# Patient Record
Sex: FEMALE | Race: WHITE | NOT HISPANIC OR LATINO
[De-identification: names, ages, dates, MRNs, and addresses within clinical notes are randomized per-mention and may not be internally consistent; named-entity substitution may affect disease eponyms.]

---

## 2017-03-07 ENCOUNTER — APPOINTMENT (OUTPATIENT)
Dept: GASTROENTEROLOGY | Facility: CLINIC | Age: 82
End: 2017-03-07

## 2017-03-07 VITALS
TEMPERATURE: 97.6 F | WEIGHT: 124 LBS | OXYGEN SATURATION: 94 % | RESPIRATION RATE: 16 BRPM | HEIGHT: 57 IN | BODY MASS INDEX: 26.75 KG/M2 | DIASTOLIC BLOOD PRESSURE: 64 MMHG | HEART RATE: 81 BPM | SYSTOLIC BLOOD PRESSURE: 110 MMHG

## 2017-11-07 ENCOUNTER — APPOINTMENT (OUTPATIENT)
Dept: GASTROENTEROLOGY | Facility: CLINIC | Age: 82
End: 2017-11-07
Payer: MEDICARE

## 2017-11-07 VITALS
HEART RATE: 69 BPM | RESPIRATION RATE: 16 BRPM | DIASTOLIC BLOOD PRESSURE: 62 MMHG | OXYGEN SATURATION: 96 % | WEIGHT: 130 LBS | TEMPERATURE: 97.5 F | HEIGHT: 57 IN | SYSTOLIC BLOOD PRESSURE: 118 MMHG | BODY MASS INDEX: 28.05 KG/M2

## 2017-11-07 DIAGNOSIS — R19.7 DIARRHEA, UNSPECIFIED: ICD-10-CM

## 2017-11-07 PROCEDURE — 99214 OFFICE O/P EST MOD 30 MIN: CPT

## 2017-11-09 LAB
C DIFF TOX GENS STL QL NAA+PROBE: NORMAL
CDIFF BY PCR: NOT DETECTED

## 2017-11-10 LAB — BACTERIA STL CULT: NORMAL

## 2017-11-11 LAB — DEPRECATED O AND P PREP STL: NORMAL

## 2017-11-13 ENCOUNTER — MESSAGE (OUTPATIENT)
Age: 82
End: 2017-11-13

## 2017-11-22 LAB — CALPROTECTIN FECAL: 153 UG/G

## 2017-11-27 ENCOUNTER — MEDICATION RENEWAL (OUTPATIENT)
Age: 82
End: 2017-11-27

## 2018-02-09 ENCOUNTER — APPOINTMENT (OUTPATIENT)
Dept: GASTROENTEROLOGY | Facility: CLINIC | Age: 83
End: 2018-02-09
Payer: MEDICARE

## 2018-02-09 ENCOUNTER — LABORATORY RESULT (OUTPATIENT)
Age: 83
End: 2018-02-09

## 2018-02-09 VITALS
BODY MASS INDEX: 28.48 KG/M2 | WEIGHT: 132 LBS | DIASTOLIC BLOOD PRESSURE: 60 MMHG | HEIGHT: 57 IN | HEART RATE: 104 BPM | OXYGEN SATURATION: 94 % | SYSTOLIC BLOOD PRESSURE: 112 MMHG | RESPIRATION RATE: 16 BRPM

## 2018-02-09 PROCEDURE — 99214 OFFICE O/P EST MOD 30 MIN: CPT | Mod: 25

## 2018-02-09 PROCEDURE — 36415 COLL VENOUS BLD VENIPUNCTURE: CPT

## 2018-02-09 RX ORDER — MESALAMINE 1000 MG/1
1000 SUPPOSITORY RECTAL
Qty: 30 | Refills: 3 | Status: ACTIVE | COMMUNITY
Start: 2018-02-09 | End: 1900-01-01

## 2018-02-10 LAB
ALBUMIN SERPL ELPH-MCNC: 3.5 G/DL
ALP BLD-CCNC: 85 U/L
ALT SERPL-CCNC: 12 U/L
ANION GAP SERPL CALC-SCNC: 15 MMOL/L
AST SERPL-CCNC: 22 U/L
BASOPHILS # BLD AUTO: 0.19 K/UL
BASOPHILS NFR BLD AUTO: 1.8 %
BILIRUB SERPL-MCNC: 0.2 MG/DL
BUN SERPL-MCNC: 12 MG/DL
CALCIUM SERPL-MCNC: 8.9 MG/DL
CHLORIDE SERPL-SCNC: 99 MMOL/L
CO2 SERPL-SCNC: 26 MMOL/L
CREAT SERPL-MCNC: 0.67 MG/DL
CRP SERPL-MCNC: 6.5 MG/DL
EOSINOPHIL # BLD AUTO: 0.64 K/UL
EOSINOPHIL NFR BLD AUTO: 6.2
GLUCOSE SERPL-MCNC: 124 MG/DL
HCT VFR BLD CALC: 37.5 %
HGB BLD-MCNC: 11.5 G/DL
LYMPHOCYTES # BLD AUTO: 2.46 K/UL
LYMPHOCYTES NFR BLD AUTO: 23.9 %
MAN DIFF?: NORMAL
MCHC RBC-ENTMCNC: 30.4 PG
MCHC RBC-ENTMCNC: 30.7 GM/DL
MCV RBC AUTO: 99.2 FL
MONOCYTES # BLD AUTO: 1.82 K/UL
MONOCYTES NFR BLD AUTO: 17.7 %
NEUTROPHILS # BLD AUTO: 5.2 K/UL
NEUTROPHILS NFR BLD AUTO: 48.6 %
PLATELET # BLD AUTO: 300 K/UL
POTASSIUM SERPL-SCNC: 3.6 MMOL/L
PROT SERPL-MCNC: 7.7 G/DL
RBC # BLD: 3.78 M/UL
RBC # FLD: 14.3 %
SODIUM SERPL-SCNC: 140 MMOL/L
T4 FREE SERPL-MCNC: 1.3 NG/DL
TSH SERPL-ACNC: 0.62 UIU/ML
WBC # FLD AUTO: 10.31 K/UL

## 2018-02-11 LAB
C DIFF TOX GENS STL QL NAA+PROBE: NORMAL
CDIFF BY PCR: DETECTED
GI PCR PANEL, STOOL: NORMAL

## 2018-02-28 ENCOUNTER — MESSAGE (OUTPATIENT)
Age: 83
End: 2018-02-28

## 2018-03-03 LAB — CALPROTECTIN FECAL: 630 UG/G

## 2018-04-05 ENCOUNTER — RX RENEWAL (OUTPATIENT)
Age: 83
End: 2018-04-05

## 2018-06-27 ENCOUNTER — MEDICATION RENEWAL (OUTPATIENT)
Age: 83
End: 2018-06-27

## 2018-11-11 ENCOUNTER — EMERGENCY (EMERGENCY)
Facility: HOSPITAL | Age: 83
LOS: 1 days | Discharge: HOME | End: 2018-11-11
Attending: EMERGENCY MEDICINE
Payer: MEDICARE

## 2018-11-11 VITALS
HEART RATE: 82 BPM | DIASTOLIC BLOOD PRESSURE: 82 MMHG | OXYGEN SATURATION: 95 % | RESPIRATION RATE: 18 BRPM | TEMPERATURE: 98 F | SYSTOLIC BLOOD PRESSURE: 148 MMHG

## 2018-11-11 VITALS
SYSTOLIC BLOOD PRESSURE: 148 MMHG | TEMPERATURE: 98 F | RESPIRATION RATE: 16 BRPM | HEART RATE: 93 BPM | HEIGHT: 57 IN | WEIGHT: 134.92 LBS | DIASTOLIC BLOOD PRESSURE: 75 MMHG | OXYGEN SATURATION: 95 %

## 2018-11-11 LAB
ALBUMIN SERPL ELPH-MCNC: 3.9 G/DL — SIGNIFICANT CHANGE UP (ref 3.3–5)
ALP SERPL-CCNC: 93 U/L — SIGNIFICANT CHANGE UP (ref 40–120)
ALT FLD-CCNC: 10 U/L — SIGNIFICANT CHANGE UP (ref 10–45)
ANION GAP SERPL CALC-SCNC: 12 MMOL/L — SIGNIFICANT CHANGE UP (ref 5–17)
AST SERPL-CCNC: 18 U/L — SIGNIFICANT CHANGE UP (ref 10–40)
BASOPHILS # BLD AUTO: 0.1 K/UL — SIGNIFICANT CHANGE UP (ref 0–0.2)
BASOPHILS NFR BLD AUTO: 0.6 % — SIGNIFICANT CHANGE UP (ref 0–2)
BILIRUB SERPL-MCNC: 0.2 MG/DL — SIGNIFICANT CHANGE UP (ref 0.2–1.2)
BUN SERPL-MCNC: 15 MG/DL — SIGNIFICANT CHANGE UP (ref 7–23)
CALCIUM SERPL-MCNC: 8.9 MG/DL — SIGNIFICANT CHANGE UP (ref 8.4–10.5)
CHLORIDE SERPL-SCNC: 99 MMOL/L — SIGNIFICANT CHANGE UP (ref 96–108)
CO2 SERPL-SCNC: 26 MMOL/L — SIGNIFICANT CHANGE UP (ref 22–31)
CREAT SERPL-MCNC: 0.54 MG/DL — SIGNIFICANT CHANGE UP (ref 0.5–1.3)
EOSINOPHIL # BLD AUTO: 0.4 K/UL — SIGNIFICANT CHANGE UP (ref 0–0.5)
EOSINOPHIL NFR BLD AUTO: 3.3 % — SIGNIFICANT CHANGE UP (ref 0–6)
GLUCOSE SERPL-MCNC: 97 MG/DL — SIGNIFICANT CHANGE UP (ref 70–99)
HCT VFR BLD CALC: 36.4 % — SIGNIFICANT CHANGE UP (ref 34.5–45)
HGB BLD-MCNC: 12 G/DL — SIGNIFICANT CHANGE UP (ref 11.5–15.5)
LYMPHOCYTES # BLD AUTO: 18.7 % — SIGNIFICANT CHANGE UP (ref 13–44)
LYMPHOCYTES # BLD AUTO: 2 K/UL — SIGNIFICANT CHANGE UP (ref 1–3.3)
MCHC RBC-ENTMCNC: 31.2 PG — SIGNIFICANT CHANGE UP (ref 27–34)
MCHC RBC-ENTMCNC: 33.1 GM/DL — SIGNIFICANT CHANGE UP (ref 32–36)
MCV RBC AUTO: 94.5 FL — SIGNIFICANT CHANGE UP (ref 80–100)
MONOCYTES # BLD AUTO: 1.1 K/UL — HIGH (ref 0–0.9)
MONOCYTES NFR BLD AUTO: 9.7 % — SIGNIFICANT CHANGE UP (ref 2–14)
NEUTROPHILS # BLD AUTO: 7.4 K/UL — SIGNIFICANT CHANGE UP (ref 1.8–7.4)
NEUTROPHILS NFR BLD AUTO: 67.6 % — SIGNIFICANT CHANGE UP (ref 43–77)
PLATELET # BLD AUTO: 265 K/UL — SIGNIFICANT CHANGE UP (ref 150–400)
POTASSIUM SERPL-MCNC: 4.3 MMOL/L — SIGNIFICANT CHANGE UP (ref 3.5–5.3)
POTASSIUM SERPL-SCNC: 4.3 MMOL/L — SIGNIFICANT CHANGE UP (ref 3.5–5.3)
PROT SERPL-MCNC: 7.2 G/DL — SIGNIFICANT CHANGE UP (ref 6–8.3)
RBC # BLD: 3.85 M/UL — SIGNIFICANT CHANGE UP (ref 3.8–5.2)
RBC # FLD: 12.5 % — SIGNIFICANT CHANGE UP (ref 10.3–14.5)
SODIUM SERPL-SCNC: 137 MMOL/L — SIGNIFICANT CHANGE UP (ref 135–145)
TROPONIN T, HIGH SENSITIVITY RESULT: 8 NG/L — SIGNIFICANT CHANGE UP (ref 0–51)
WBC # BLD: 10.9 K/UL — HIGH (ref 3.8–10.5)
WBC # FLD AUTO: 10.9 K/UL — HIGH (ref 3.8–10.5)

## 2018-11-11 PROCEDURE — 72170 X-RAY EXAM OF PELVIS: CPT

## 2018-11-11 PROCEDURE — 99284 EMERGENCY DEPT VISIT MOD MDM: CPT | Mod: GC,25

## 2018-11-11 PROCEDURE — 73060 X-RAY EXAM OF HUMERUS: CPT | Mod: 26,RT

## 2018-11-11 PROCEDURE — 99284 EMERGENCY DEPT VISIT MOD MDM: CPT | Mod: 25

## 2018-11-11 PROCEDURE — 72170 X-RAY EXAM OF PELVIS: CPT | Mod: 26

## 2018-11-11 PROCEDURE — 84484 ASSAY OF TROPONIN QUANT: CPT

## 2018-11-11 PROCEDURE — 73030 X-RAY EXAM OF SHOULDER: CPT | Mod: 26,RT

## 2018-11-11 PROCEDURE — 70450 CT HEAD/BRAIN W/O DYE: CPT | Mod: 26

## 2018-11-11 PROCEDURE — 93010 ELECTROCARDIOGRAM REPORT: CPT | Mod: GC

## 2018-11-11 PROCEDURE — 73130 X-RAY EXAM OF HAND: CPT | Mod: 26,LT

## 2018-11-11 PROCEDURE — 73020 X-RAY EXAM OF SHOULDER: CPT

## 2018-11-11 PROCEDURE — 71045 X-RAY EXAM CHEST 1 VIEW: CPT

## 2018-11-11 PROCEDURE — 73060 X-RAY EXAM OF HUMERUS: CPT

## 2018-11-11 PROCEDURE — 73030 X-RAY EXAM OF SHOULDER: CPT

## 2018-11-11 PROCEDURE — 85027 COMPLETE CBC AUTOMATED: CPT

## 2018-11-11 PROCEDURE — 73130 X-RAY EXAM OF HAND: CPT

## 2018-11-11 PROCEDURE — 80053 COMPREHEN METABOLIC PANEL: CPT

## 2018-11-11 PROCEDURE — 71045 X-RAY EXAM CHEST 1 VIEW: CPT | Mod: 26

## 2018-11-11 PROCEDURE — 70450 CT HEAD/BRAIN W/O DYE: CPT

## 2018-11-11 PROCEDURE — 93005 ELECTROCARDIOGRAM TRACING: CPT

## 2018-11-11 NOTE — ED ADULT NURSE REASSESSMENT NOTE - NS ED NURSE REASSESS COMMENT FT1
report received from MOSHE BOOKER. pt found resting in semi-dickinson position, non-labored respirations. Pt denies complaints at this time. Pt to be discharged upon final read of results. Family at bedside, will reassess

## 2018-11-11 NOTE — ED PROVIDER NOTE - OBJECTIVE STATEMENT
Sommer Thomas MD PGY-1 Pt is a 89 yo F PMH UC (on balsalazide), HTN, HLD who presents with fall this afternoon 12 PM, not on AC Sommer Thomas MD PGY-1 Pt is a 89 yo F PMH UC (on balsalazide), HTN, HLD who presents with fall this afternoon 12 PM, not on AC, unwitnessed. Fell down and dropped a chair on her left fourth finger, and hit her head on the floor. Denies LOC. Endorses 2 weeks of R shoulder and upper arm pain - does not remember what she did or if she fell. Denies any dizziness or chest pain surrounding fall.

## 2018-11-11 NOTE — ED PROVIDER NOTE - MEDICAL DECISION MAKING DETAILS
Sommer Thomas MD PGY-1 Pt is a 89 yo F PMH UC (on balsalazide), HTN, HLD who presents with fall this afternoon 12 PM, not on AC, unwitnessed. Will CTh, Xray R shoulder, L hand, pelvis, basic labs, reassess.

## 2018-11-11 NOTE — ED PROVIDER NOTE - ATTENDING CONTRIBUTION TO CARE
89 y/o female with the above documented history and HPI who on exam appears well and comfortable. VSs noted, head NC c/ small area of ecchymosis at L parietal scalp s/ palpable crack, crepitus, depression, preauricular ecchymosis or otorrhea, face s/ periorbital ecchymosis, rhinorrhea or epistaxis, EOMsI, neck supple c/ FROM s/ pain, paresthesia or midline c-spine ttp, lungs CTA, chest wall s/ ttp, cardiac sounds s/ audible m/r/g, abdomen soft, NT/ND, back s/ midline ttp, extremities s/ asymmetry c/ FROM X 4 c/ some discomfort at R shoulder on ROM testing, skin c/ ecchymosis of L 4th finger s/ bony point ttp c/ FROM of DIP, PIP and MCP s/ laceration and neurologically completely intact. Appropriate screening studies obtained. No emergent abnormality identified. Not in need of any addt'l emergent investigation or intervention. Discharged c/ appropriate instruction accompanied by family.

## 2018-11-11 NOTE — ED PROVIDER NOTE - PROGRESS NOTE DETAILS
Sommer Thomas MD PGY-1 Pt feeling well and wants to go home. Will d/c home with outpt f/u, strict return precautions and pain control

## 2018-11-11 NOTE — ED PROVIDER NOTE - PMH
GERD (gastroesophageal reflux disease)    HLD (hyperlipidemia)    HTN (hypertension)    Hypothyroid    Ulcerative colitis

## 2018-11-11 NOTE — ED PROVIDER NOTE - NSFOLLOWUPINSTRUCTIONS_ED_ALL_ED_FT
1. You were seen in the Emergency Room for fall  2. You may take Tylenol 650 mg every 6 hours as needed for pain.   3. Please follow up with your doctor in 24-48 hours.  4. Return to the emergency room or seek immediate assistance for any new or concerning symptoms (such as fevers, chills, inability to walk, weakness, decreased sensation, change in bowel or bladder habits, bleeding, confusion), or if you get worse.   5. Copies of your tests were provided to you for follow-up.  You must address all your findings with your doctor.

## 2018-11-11 NOTE — ED PROVIDER NOTE - CARE PLAN
Principal Discharge DX:	Closed head injury  Assessment and plan of treatment:	The patient was discharged from the ED in stable condition. All results of today's workup were discussed with the patient and all questions/concerns were addressed. All discharge instructions were thoroughly verbally discussed with the patient, as well as important warning signs and new/ worsening symptoms which should necessitate patient's immediate return to the ED. The patient is agreeable with discharge and expresses full understanding of all instructions given.  Secondary Diagnosis:	Fall  Secondary Diagnosis:	Contusion

## 2018-11-11 NOTE — ED PROVIDER NOTE - NS ED ROS FT
REVIEW OF SYSTEMS:  General:  no fever, no chills  HEENT: no headache,   Cardiac: no chest pain  Respiratory:  no shortness of breath  Gastrointestinal: no abdominal pain, no nausea, no vomiting,  Extremities: +left fourth finger pain and swelling, +R shoulder and R upper extremity pain, + L gluteal pain  Neuro: no focal weakness, no numbness/tingling of the extremities, no decreased sensation    -Sommer Thomas PGY-1

## 2018-11-11 NOTE — ED ADULT NURSE NOTE - NSIMPLEMENTINTERV_GEN_ALL_ED
Implemented All Fall with Harm Risk Interventions:  [place] to call system. Call bell, personal items and telephone within reach. Instruct patient to call for assistance. Room bathroom lighting operational. Non-slip footwear when patient is off stretcher. Physically safe environment: no spills, clutter or unnecessary equipment. Stretcher in lowest position, wheels locked, appropriate side rails in place. Provide visual cue, wrist band, yellow gown, etc. Monitor gait and stability. Monitor for mental status changes and reorient to person, place, and time. Review medications for side effects contributing to fall risk. Reinforce activity limits and safety measures with patient and family. Provide visual clues: red socks.

## 2018-11-26 PROBLEM — E03.9 HYPOTHYROIDISM, UNSPECIFIED: Chronic | Status: ACTIVE | Noted: 2018-11-11

## 2018-11-26 PROBLEM — K51.90 ULCERATIVE COLITIS, UNSPECIFIED, WITHOUT COMPLICATIONS: Chronic | Status: ACTIVE | Noted: 2018-11-11

## 2018-11-26 PROBLEM — I10 ESSENTIAL (PRIMARY) HYPERTENSION: Chronic | Status: ACTIVE | Noted: 2018-11-11

## 2018-11-26 PROBLEM — K21.9 GASTRO-ESOPHAGEAL REFLUX DISEASE WITHOUT ESOPHAGITIS: Chronic | Status: ACTIVE | Noted: 2018-11-11

## 2018-11-26 PROBLEM — E78.5 HYPERLIPIDEMIA, UNSPECIFIED: Chronic | Status: ACTIVE | Noted: 2018-11-11

## 2018-11-27 ENCOUNTER — APPOINTMENT (OUTPATIENT)
Dept: GASTROENTEROLOGY | Facility: CLINIC | Age: 83
End: 2018-11-27
Payer: MEDICARE

## 2018-11-27 VITALS
TEMPERATURE: 97.9 F | DIASTOLIC BLOOD PRESSURE: 70 MMHG | HEART RATE: 88 BPM | SYSTOLIC BLOOD PRESSURE: 122 MMHG | OXYGEN SATURATION: 95 % | HEIGHT: 57 IN | RESPIRATION RATE: 16 BRPM

## 2018-11-27 PROCEDURE — 99214 OFFICE O/P EST MOD 30 MIN: CPT

## 2018-11-29 LAB
C DIFF TOX GENS STL QL NAA+PROBE: NORMAL
CDIFF BY PCR: NOT DETECTED

## 2018-12-04 ENCOUNTER — MESSAGE (OUTPATIENT)
Age: 83
End: 2018-12-04

## 2018-12-06 LAB — CALPROTECTIN FECAL: 433 UG/G

## 2018-12-15 LAB — GI PCR PANEL, STOOL: NORMAL

## 2018-12-20 ENCOUNTER — MEDICATION RENEWAL (OUTPATIENT)
Age: 83
End: 2018-12-20

## 2019-01-23 ENCOUNTER — OTHER (OUTPATIENT)
Age: 84
End: 2019-01-23

## 2019-01-24 ENCOUNTER — MEDICATION RENEWAL (OUTPATIENT)
Age: 84
End: 2019-01-24

## 2019-01-24 DIAGNOSIS — A04.72 ENTEROCOLITIS DUE TO CLOSTRIDIUM DIFFICILE, NOT SPECIFIED AS RECURRENT: ICD-10-CM

## 2019-01-24 LAB
C DIFF TOX GENS STL QL NAA+PROBE: NORMAL
CDIFF BY PCR: DETECTED

## 2019-01-24 RX ORDER — VANCOMYCIN HYDROCHLORIDE 125 MG/1
125 CAPSULE ORAL 4 TIMES DAILY
Qty: 56 | Refills: 2 | Status: ACTIVE | COMMUNITY
Start: 2019-01-24 | End: 1900-01-01

## 2019-01-25 ENCOUNTER — MEDICATION RENEWAL (OUTPATIENT)
Age: 84
End: 2019-01-25

## 2019-01-25 ENCOUNTER — MESSAGE (OUTPATIENT)
Age: 84
End: 2019-01-25

## 2019-02-07 ENCOUNTER — MEDICATION RENEWAL (OUTPATIENT)
Age: 84
End: 2019-02-07

## 2019-02-07 RX ORDER — PANTOPRAZOLE 40 MG/1
40 TABLET, DELAYED RELEASE ORAL
Qty: 90 | Refills: 3 | Status: ACTIVE | COMMUNITY
Start: 2018-02-09 | End: 1900-01-01

## 2019-04-12 ENCOUNTER — MESSAGE (OUTPATIENT)
Age: 84
End: 2019-04-12

## 2019-04-12 ENCOUNTER — RX RENEWAL (OUTPATIENT)
Age: 84
End: 2019-04-12

## 2019-04-12 RX ORDER — BALSALAZIDE DISODIUM 750 MG/1
750 CAPSULE ORAL
Qty: 810 | Refills: 0 | Status: ACTIVE | COMMUNITY
Start: 2017-03-07 | End: 1900-01-01

## 2019-04-12 RX ORDER — VANCOMYCIN HYDROCHLORIDE 125 MG/1
125 CAPSULE ORAL 4 TIMES DAILY
Qty: 56 | Refills: 2 | Status: COMPLETED | COMMUNITY
Start: 2018-02-11 | End: 2019-04-12

## 2019-04-12 RX ORDER — MESALAMINE 1000 MG/1
1000 SUPPOSITORY RECTAL
Qty: 30 | Refills: 3 | Status: COMPLETED | COMMUNITY
Start: 2017-11-27 | End: 2019-04-12

## 2019-04-12 RX ORDER — MESALAMINE 1000 MG/1
1000 SUPPOSITORY RECTAL
Qty: 30 | Refills: 3 | Status: COMPLETED | COMMUNITY
Start: 2018-12-20 | End: 2019-04-12

## 2019-04-23 ENCOUNTER — INPATIENT (INPATIENT)
Facility: HOSPITAL | Age: 84
LOS: 4 days | Discharge: HOME | DRG: 948 | End: 2019-04-28
Attending: INTERNAL MEDICINE | Admitting: INTERNAL MEDICINE
Payer: MEDICARE

## 2019-04-23 VITALS
RESPIRATION RATE: 18 BRPM | SYSTOLIC BLOOD PRESSURE: 170 MMHG | HEIGHT: 57 IN | OXYGEN SATURATION: 95 % | TEMPERATURE: 98 F | DIASTOLIC BLOOD PRESSURE: 74 MMHG | WEIGHT: 130.07 LBS | HEART RATE: 108 BPM

## 2019-04-23 DIAGNOSIS — R41.82 ALTERED MENTAL STATUS, UNSPECIFIED: ICD-10-CM

## 2019-04-23 LAB
ALBUMIN SERPL ELPH-MCNC: 4.2 G/DL — SIGNIFICANT CHANGE UP (ref 3.3–5)
ALP SERPL-CCNC: 105 U/L — SIGNIFICANT CHANGE UP (ref 40–120)
ALT FLD-CCNC: 19 U/L — SIGNIFICANT CHANGE UP (ref 10–45)
ANION GAP SERPL CALC-SCNC: 17 MMOL/L — SIGNIFICANT CHANGE UP (ref 5–17)
APPEARANCE UR: CLEAR — SIGNIFICANT CHANGE UP
AST SERPL-CCNC: 32 U/L — SIGNIFICANT CHANGE UP (ref 10–40)
BASOPHILS # BLD AUTO: 0 K/UL — SIGNIFICANT CHANGE UP (ref 0–0.2)
BASOPHILS NFR BLD AUTO: 0.3 % — SIGNIFICANT CHANGE UP (ref 0–2)
BILIRUB SERPL-MCNC: 0.4 MG/DL — SIGNIFICANT CHANGE UP (ref 0.2–1.2)
BILIRUB UR-MCNC: NEGATIVE — SIGNIFICANT CHANGE UP
BUN SERPL-MCNC: 28 MG/DL — HIGH (ref 7–23)
CALCIUM SERPL-MCNC: 9.5 MG/DL — SIGNIFICANT CHANGE UP (ref 8.4–10.5)
CHLORIDE SERPL-SCNC: 99 MMOL/L — SIGNIFICANT CHANGE UP (ref 96–108)
CO2 SERPL-SCNC: 22 MMOL/L — SIGNIFICANT CHANGE UP (ref 22–31)
COLOR SPEC: ABNORMAL
CREAT SERPL-MCNC: 0.62 MG/DL — SIGNIFICANT CHANGE UP (ref 0.5–1.3)
DIFF PNL FLD: NEGATIVE — SIGNIFICANT CHANGE UP
EOSINOPHIL # BLD AUTO: 0 K/UL — SIGNIFICANT CHANGE UP (ref 0–0.5)
EOSINOPHIL NFR BLD AUTO: 0.2 % — SIGNIFICANT CHANGE UP (ref 0–6)
GAS PNL BLDV: SIGNIFICANT CHANGE UP
GAS PNL BLDV: SIGNIFICANT CHANGE UP
GLUCOSE SERPL-MCNC: 142 MG/DL — HIGH (ref 70–99)
GLUCOSE UR QL: NEGATIVE — SIGNIFICANT CHANGE UP
HCT VFR BLD CALC: 38.5 % — SIGNIFICANT CHANGE UP (ref 34.5–45)
HGB BLD-MCNC: 12.9 G/DL — SIGNIFICANT CHANGE UP (ref 11.5–15.5)
KETONES UR-MCNC: NEGATIVE — SIGNIFICANT CHANGE UP
LEUKOCYTE ESTERASE UR-ACNC: ABNORMAL
LYMPHOCYTES # BLD AUTO: 17 % — SIGNIFICANT CHANGE UP (ref 13–44)
LYMPHOCYTES # BLD AUTO: 2 K/UL — SIGNIFICANT CHANGE UP (ref 1–3.3)
MCHC RBC-ENTMCNC: 32.1 PG — SIGNIFICANT CHANGE UP (ref 27–34)
MCHC RBC-ENTMCNC: 33.5 GM/DL — SIGNIFICANT CHANGE UP (ref 32–36)
MCV RBC AUTO: 96 FL — SIGNIFICANT CHANGE UP (ref 80–100)
MONOCYTES # BLD AUTO: 1.1 K/UL — HIGH (ref 0–0.9)
MONOCYTES NFR BLD AUTO: 9.4 % — SIGNIFICANT CHANGE UP (ref 2–14)
NEUTROPHILS # BLD AUTO: 8.5 K/UL — HIGH (ref 1.8–7.4)
NEUTROPHILS NFR BLD AUTO: 73.1 % — SIGNIFICANT CHANGE UP (ref 43–77)
NITRITE UR-MCNC: NEGATIVE — SIGNIFICANT CHANGE UP
PH UR: 5.5 — SIGNIFICANT CHANGE UP (ref 5–8)
PLATELET # BLD AUTO: 257 K/UL — SIGNIFICANT CHANGE UP (ref 150–400)
POTASSIUM SERPL-MCNC: 4.2 MMOL/L — SIGNIFICANT CHANGE UP (ref 3.5–5.3)
POTASSIUM SERPL-SCNC: 4.2 MMOL/L — SIGNIFICANT CHANGE UP (ref 3.5–5.3)
PROT SERPL-MCNC: 7.7 G/DL — SIGNIFICANT CHANGE UP (ref 6–8.3)
PROT UR-MCNC: ABNORMAL
RBC # BLD: 4.01 M/UL — SIGNIFICANT CHANGE UP (ref 3.8–5.2)
RBC # FLD: 13 % — SIGNIFICANT CHANGE UP (ref 10.3–14.5)
SODIUM SERPL-SCNC: 138 MMOL/L — SIGNIFICANT CHANGE UP (ref 135–145)
SP GR SPEC: 1.03 — HIGH (ref 1.01–1.02)
T4 FREE SERPL-MCNC: 1.6 NG/DL — SIGNIFICANT CHANGE UP (ref 0.9–1.8)
TROPONIN T, HIGH SENSITIVITY RESULT: 15 NG/L — SIGNIFICANT CHANGE UP (ref 0–51)
TSH SERPL-MCNC: 1.33 UIU/ML — SIGNIFICANT CHANGE UP (ref 0.27–4.2)
UROBILINOGEN FLD QL: NEGATIVE — SIGNIFICANT CHANGE UP
WBC # BLD: 11.7 K/UL — HIGH (ref 3.8–10.5)
WBC # FLD AUTO: 11.7 K/UL — HIGH (ref 3.8–10.5)

## 2019-04-23 PROCEDURE — 93010 ELECTROCARDIOGRAM REPORT: CPT

## 2019-04-23 PROCEDURE — 99285 EMERGENCY DEPT VISIT HI MDM: CPT | Mod: GC,25

## 2019-04-23 PROCEDURE — 70450 CT HEAD/BRAIN W/O DYE: CPT | Mod: 26

## 2019-04-23 PROCEDURE — 71045 X-RAY EXAM CHEST 1 VIEW: CPT | Mod: 26

## 2019-04-23 PROCEDURE — 73522 X-RAY EXAM HIPS BI 3-4 VIEWS: CPT | Mod: 26

## 2019-04-23 RX ORDER — SERTRALINE 25 MG/1
50 TABLET, FILM COATED ORAL DAILY
Qty: 0 | Refills: 0 | Status: DISCONTINUED | OUTPATIENT
Start: 2019-04-23 | End: 2019-04-28

## 2019-04-23 RX ORDER — METOPROLOL TARTRATE 50 MG
25 TABLET ORAL DAILY
Qty: 0 | Refills: 0 | Status: DISCONTINUED | OUTPATIENT
Start: 2019-04-23 | End: 2019-04-28

## 2019-04-23 RX ORDER — DULOXETINE HYDROCHLORIDE 30 MG/1
60 CAPSULE, DELAYED RELEASE ORAL DAILY
Qty: 0 | Refills: 0 | Status: DISCONTINUED | OUTPATIENT
Start: 2019-04-23 | End: 2019-04-28

## 2019-04-23 RX ORDER — FOLIC ACID 0.8 MG
1 TABLET ORAL DAILY
Qty: 0 | Refills: 0 | Status: DISCONTINUED | OUTPATIENT
Start: 2019-04-23 | End: 2019-04-28

## 2019-04-23 RX ORDER — LACTOBACILLUS ACIDOPHILUS 100MM CELL
1 CAPSULE ORAL
Qty: 0 | Refills: 0 | Status: DISCONTINUED | OUTPATIENT
Start: 2019-04-23 | End: 2019-04-28

## 2019-04-23 RX ORDER — SODIUM CHLORIDE 9 MG/ML
500 INJECTION INTRAMUSCULAR; INTRAVENOUS; SUBCUTANEOUS ONCE
Qty: 0 | Refills: 0 | Status: COMPLETED | OUTPATIENT
Start: 2019-04-23 | End: 2019-04-23

## 2019-04-23 RX ORDER — HEPARIN SODIUM 5000 [USP'U]/ML
5000 INJECTION INTRAVENOUS; SUBCUTANEOUS EVERY 12 HOURS
Qty: 0 | Refills: 0 | Status: DISCONTINUED | OUTPATIENT
Start: 2019-04-23 | End: 2019-04-28

## 2019-04-23 RX ORDER — ATORVASTATIN CALCIUM 80 MG/1
20 TABLET, FILM COATED ORAL AT BEDTIME
Qty: 0 | Refills: 0 | Status: DISCONTINUED | OUTPATIENT
Start: 2019-04-23 | End: 2019-04-28

## 2019-04-23 RX ORDER — LEVOTHYROXINE SODIUM 125 MCG
50 TABLET ORAL DAILY
Qty: 0 | Refills: 0 | Status: DISCONTINUED | OUTPATIENT
Start: 2019-04-23 | End: 2019-04-28

## 2019-04-23 RX ADMIN — SODIUM CHLORIDE 500 MILLILITER(S): 9 INJECTION INTRAMUSCULAR; INTRAVENOUS; SUBCUTANEOUS at 17:07

## 2019-04-23 RX ADMIN — SODIUM CHLORIDE 500 MILLILITER(S): 9 INJECTION INTRAMUSCULAR; INTRAVENOUS; SUBCUTANEOUS at 13:51

## 2019-04-23 NOTE — ED ADULT NURSE NOTE - CHPI ED NUR SYMPTOMS NEG
no chills/no nausea/no decreased eating/drinking/no fever/no weakness/no tingling/no dizziness/no vomiting

## 2019-04-23 NOTE — ED ADULT NURSE REASSESSMENT NOTE - NS ED NURSE REASSESS COMMENT FT1
Patient straight catheterized as per order using sterile technique. First attempt successful. Patient tolerated procedure well. No blood noted. 20 cc of concentrated yellow urine output noted. ED MD Joseph made aware. Safety and comfort measures provided.

## 2019-04-23 NOTE — H&P ADULT - HISTORY OF PRESENT ILLNESS
Pt is a 89 yo female PMH UC, HTN, HLD, hypothyroidism, GERD, hx of falls coming in for acute mental status change since Monday. History provided by pt's daughter. Pt is acutely confused since Monday, answering her door in her undergarment, bringing down her walker from the second floor (she normally has walkers in every floor), confusing her radio for her telephone. Patient also complaining of hip pain since Friday. There is concern patient may have had a fall. When questioned, pt pleasantly confused (states her gown is black), pt not aware of anything wrong with her but reports +abd pain, hip pain and burning and blood in her urine.

## 2019-04-23 NOTE — ED PROVIDER NOTE - ATTENDING CONTRIBUTION TO CARE
Agree with above, Gabriel Breaux MD, FACEP   Will follow up on labs, analgesia, imaging, reassess and disposition to the inpatient team as clinically indicated.   Based on patient's history and physical exam, as well as the results of today's workup, I feel that patient warrants admission to the hospital for further workup/evaluation and continued management. I discussed the findings of today's workup with the patient and addressed the patient's questions and concerns. The patient was agreeable with admission. Our team spoke with the hospitalist who accepted the patient for admission and subsequently took over the patient's care.

## 2019-04-23 NOTE — ED ADULT NURSE NOTE - OBJECTIVE STATEMENT
89 y/o female presenting to the ED via walking in brought in by daughter complaining of AMS. PMH HTN, HLD, GERD, HYPOTHYROID, ULCERATIVE COLITIS. Per daughter patient was complaining of lower back pain/buttock pain x Friday 4/19. Per daughter noted patient to be acting "confused" at home x yesterday. Per daughter patient brought walker down from upstairs which she does not need to. On arrival patient awake and oriented to self and place only. No respiratory distress noted. Patient denies any chest pain, dizziness, n/v/d, numbness, tingling, SOB, headache, cough, chills, fevers. Abdomen soft, nondistended nontender to palpation. Safety and comfort measures provided. Daughter at bedside. Safety and comfort measures provided.

## 2019-04-23 NOTE — ED PROVIDER NOTE - NS ED ROS FT
REVIEW OF SYSTEMS:  minimally elicited as pt pleasantly confused  NECK: No pain   RESPIRATORY:  No shortness of breath  CHEST: Reports L sided pain on palpation,   GASTROINTESTINAL: + Abd diffused pain  GENITOURINARY: +dysuria and hematuria  NEUROLOGICAL: No numbness or weakness  SKIN: No itching, burning, rashes, or lesions   MSK: Hip pain. REVIEW OF SYSTEMS:  minimally elicited as pt pleasantly confused  NECK: No pain   RESPIRATORY:  No shortness of breath  CHEST: Reports L sided pain on palpation,   GASTROINTESTINAL: + Abd diffused pain  GENITOURINARY: +dysuria and hematuria  NEUROLOGICAL: No numbness or weakness  SKIN: No itching, burning, rashes, or lesions   MSK: Hip pain.  ROS as per HPI, attending statement, or otherwise negative.

## 2019-04-23 NOTE — H&P ADULT - ASSESSMENT
Pt is a 89 yo female PMH UC, HTN, HLD, hypothyroidism, GERD, hx of falls coming in for acute mental status change since Monday. History provided by pt's daughter. Pt is acutely confused since Monday, answering her door in her undergarment, bringing down her walker from the second floor (she normally has walkers in every floor), confusing her radio for her telephone. Patient also complaining of hip pain since Friday. There is concern patient may have had a fall. When questioned, pt pleasantly confused (states her gown is black), pt not aware of anything wrong with her but reports +abd pain, hip pain and burning and blood in her urine. Pt is a 89 yo female PMH UC, HTN, HLD, hypothyroidism, GERD, hx of falls coming in for acute mental status change since Monday. History provided by pt's daughter. Pt is acutely confused since Monday, answering her door in her undergarment, bringing down her walker from the second floor (she normally has walkers in every floor), confusing her radio for her telephone. Patient also complaining of hip pain since Friday. There is concern patient may have had a fall. When questioned, pt pleasantly confused (states her gown is black), pt not aware of anything wrong with her but reports +abd pain, hip pain and burning and blood in her urine.    Neuro: Altered mental status, no clear cause. CT brain no acute findings. UA, CXR all negative for infection. Labs all WNL. Will order B-12, folate,   TSH in AM. Doubt need for MRI of brain. PT eval requested.     CV: Continue Toprol XL 25 mg/day and Lipitor 20 mg/day.      Psych: Continue Zoloft 50 mg/day and Cymbalta 60 mg/day. Psych consult for today.      Endo: Continue Synthyroid 50 mcg/day, TSH in AM.

## 2019-04-23 NOTE — ED ADULT NURSE REASSESSMENT NOTE - NS ED NURSE REASSESS COMMENT FT1
Patient appears to be resting comfortably in stretcher. Patient denies any dizziness, n/v/d, numbness, tingling, SOB. A&OX2 to self and place only. Safety and comfort measures provided. Daughter at bedside.

## 2019-04-23 NOTE — H&P ADULT - NSICDXPASTMEDICALHX_GEN_ALL_CORE_FT
PAST MEDICAL HISTORY:  GERD (gastroesophageal reflux disease)     HLD (hyperlipidemia)     HTN (hypertension)     Hypothyroid     Ulcerative colitis

## 2019-04-23 NOTE — ED PROVIDER NOTE - PROGRESS NOTE DETAILS
Sepsis re-evaluation- Vital signs appreciated HR now 90s, Capillary Refill: <2 sec; Pulses: full/equal bilaterally; Skin: Normal; Lungs: CTA b/l. I have re-evaluated the patient's fluid status and reviewed the vital signs.  Clinical evaluation demonstrates an appropriate response to fluid resuscitation, patient responding well to resuscitation.

## 2019-04-23 NOTE — ED PROVIDER NOTE - OBJECTIVE STATEMENT
Pt is a 89yo F PMH pertinent for UC, HTN, HLD, hypothyroidism, GERD, hx of falls coming in for acute mental status change since Monday. History provided by pt's daughter. Pt is acutely confused since Monday, answering her door in her undergarment, bringing down her walker from the second floor (she normally has walkers in every floor), confusing her radio for her telephone. Patient also complaining of hip pain since Friday. There is concern patient may have had a fall. When questioned, pt pleasantly confused (states her gown is black), pt not aware of anything wrong with her but reports +abd pain, hip pain and burning and blood in her urine.

## 2019-04-23 NOTE — ED PROVIDER NOTE - CARE PLAN
Principal Discharge DX:	Altered mental status  Secondary Diagnosis:	HTN (hypertension) Principal Discharge DX:	Altered mental status  Goal:	encephalopathy, initial encounter  Secondary Diagnosis:	HTN (hypertension)

## 2019-04-23 NOTE — ED PROVIDER NOTE - CLINICAL SUMMARY MEDICAL DECISION MAKING FREE TEXT BOX
Pt is a 89yo F PMH pertinent for UC, HTN, HLD, hypothyroidism, GERD, hx of falls coming in for acute mental status change since Monday. Pt is acutely confused since Monday. Also reported hip pain since Friday. Patient pleasantly confused, endorses burning and blood in her urine. On PE, appears dry and  was tachy to 108, although hypertensive. Concerns for infectious etiologies (UTI) vs less likely metabolic, cardiovascular and endocrine etiologies. Will order CBC, electrolytes, Urinalysis, Urine and blood cultures, CXR, trops, TSH/FT4, and pelvic xrays.

## 2019-04-23 NOTE — ED PROVIDER NOTE - PHYSICAL EXAMINATION
PHYSICAL EXAM:  GENERAL: Pleasantly confused  HEAD:  Atraumatic, Normocephalic  EYES:  conjunctiva and sclera clear  ENMT: Dry mucous membranes  NECK: Supple  HEART: Regular rate and rhythm; No murmurs, rubs, or gallops  RESPIRATORY: CTA B/L, No W/R/R  ABDOMEN: Soft, Nondistended; Umbilical hernia present, diffused mild tenderness   NEUROLOGY: A&Ox1-2, nonfocal, moving all extremities  EXTREMITIES:  No pedal edema   SKIN: warm, dry, normal color,

## 2019-04-23 NOTE — H&P ADULT - NSHPLABSRESULTS_GEN_ALL_CORE
LABS:                        12.9   11.7  )-----------( 257      ( 2019 13:24 )             38.5     04-    138  |  99  |  28<H>  ----------------------------<  142<H>  4.2   |  22  |  0.62    Ca    9.5      2019 13:24    TPro  7.7  /  Alb  4.2  /  TBili  0.4  /  DBili  x   /  AST  32  /  ALT  19  /  AlkPhos  105  04-23      Urinalysis Basic - ( 2019 13:24 )    Color: Dark Yellow / Appearance: Clear / S.034 / pH: x  Gluc: x / Ketone: Negative  / Bili: Negative / Urobili: Negative   Blood: x / Protein: 30 mg/dL / Nitrite: Negative   Leuk Esterase: Small / RBC: 4 /hpf / WBC 2 /HPF   Sq Epi: x / Non Sq Epi: 4 /hpf / Bacteria: Negative          RADIOLOGY & ADDITIONAL TESTS:

## 2019-04-23 NOTE — H&P ADULT - NSHPPHYSICALEXAM_GEN_ALL_CORE
PHYSICAL EXAMINATION:  Vital Signs Last 24 Hrs  T(C): 37.1 (23 Apr 2019 19:20), Max: 37.5 (23 Apr 2019 13:09)  T(F): 98.7 (23 Apr 2019 19:20), Max: 99.5 (23 Apr 2019 13:09)  HR: 99 (23 Apr 2019 19:20) (70 - 108)  BP: 131/73 (23 Apr 2019 19:20) (131/73 - 170/74)  BP(mean): --  RR: 16 (23 Apr 2019 19:20) (15 - 18)  SpO2: 95% (23 Apr 2019 19:20) (95% - 96%)  CAPILLARY BLOOD GLUCOSE          GENERAL: NAD, well-groomed, well-developed  HEAD:  atraumatic, normocephalic  EYES: sclera anicteric  ENMT: mucous membranes moist  NECK: supple, No JVD  CHEST/LUNG: clear to auscultation bilaterally; no rales, rhonchi, or wheezing b/l  HEART: normal S1, S2  ABDOMEN: BS+, soft, ND, NT   EXTREMITIES:  pulses palpable; no clubbing, cyanosis, or edema b/l LEs  NEURO: awake, alert, interactive; moves all extremities  SKIN: no rashes or lesions PHYSICAL EXAMINATION:  Vital Signs Last 24 Hrs  T(C): 37.1 (23 Apr 2019 19:20), Max: 37.5 (23 Apr 2019 13:09)  T(F): 98.7 (23 Apr 2019 19:20), Max: 99.5 (23 Apr 2019 13:09)  HR: 99 (23 Apr 2019 19:20) (70 - 108)  BP: 131/73 (23 Apr 2019 19:20) (131/73 - 170/74)  BP(mean): --  RR: 16 (23 Apr 2019 19:20) (15 - 18)  SpO2: 95% (23 Apr 2019 19:20) (95% - 96%)  CAPILLARY BLOOD GLUCOSE          GENERAL: NAD, seen in ER, no fevers or SOB  HEAD:  atraumatic, normocephalic  EYES: sclera anicteric  ENMT: mucous membranes moist  NECK: supple, No JVD  CHEST/LUNG: clear to auscultation bilaterally; no rales, rhonchi, or wheezing b/l  HEART: normal S1, S2  ABDOMEN: BS+, soft, ND, NT   EXTREMITIES:  pulses palpable; no clubbing, cyanosis, or edema b/l LEs  NEURO: awake, alert, interactive; moves all extremities  SKIN: no rashes or lesions

## 2019-04-24 DIAGNOSIS — F05 DELIRIUM DUE TO KNOWN PHYSIOLOGICAL CONDITION: ICD-10-CM

## 2019-04-24 LAB
CULTURE RESULTS: NO GROWTH — SIGNIFICANT CHANGE UP
FOLATE SERPL-MCNC: >20 NG/ML — SIGNIFICANT CHANGE UP
RAPID RVP RESULT: SIGNIFICANT CHANGE UP
SPECIMEN SOURCE: SIGNIFICANT CHANGE UP
TROPONIN T, HIGH SENSITIVITY RESULT: 15 NG/L — SIGNIFICANT CHANGE UP (ref 0–51)
TSH SERPL-MCNC: 0.96 UIU/ML — SIGNIFICANT CHANGE UP (ref 0.27–4.2)
VIT B12 SERPL-MCNC: 943 PG/ML — SIGNIFICANT CHANGE UP (ref 232–1245)

## 2019-04-24 PROCEDURE — 99222 1ST HOSP IP/OBS MODERATE 55: CPT

## 2019-04-24 RX ORDER — SODIUM CHLORIDE 5 %
4 DROPS OPHTHALMIC (EYE)
Qty: 0 | Refills: 0 | Status: DISCONTINUED | OUTPATIENT
Start: 2019-04-24 | End: 2019-04-24

## 2019-04-24 RX ORDER — HALOPERIDOL DECANOATE 100 MG/ML
0.5 INJECTION INTRAMUSCULAR EVERY 6 HOURS
Qty: 0 | Refills: 0 | Status: DISCONTINUED | OUTPATIENT
Start: 2019-04-24 | End: 2019-04-28

## 2019-04-24 RX ORDER — SODIUM CHLORIDE 5 %
1 DROPS OPHTHALMIC (EYE)
Qty: 0 | Refills: 0 | Status: DISCONTINUED | OUTPATIENT
Start: 2019-04-24 | End: 2019-04-28

## 2019-04-24 RX ORDER — BALSALAZIDE DISODIUM 750 MG/1
3 CAPSULE ORAL
Qty: 0 | Refills: 0 | COMMUNITY

## 2019-04-24 RX ORDER — PREDNISOLONE SODIUM PHOSPHATE 1 %
1 DROPS OPHTHALMIC (EYE) DAILY
Qty: 0 | Refills: 0 | Status: DISCONTINUED | OUTPATIENT
Start: 2019-04-24 | End: 2019-04-25

## 2019-04-24 RX ORDER — LANOLIN ALCOHOL/MO/W.PET/CERES
3 CREAM (GRAM) TOPICAL AT BEDTIME
Qty: 0 | Refills: 0 | Status: DISCONTINUED | OUTPATIENT
Start: 2019-04-24 | End: 2019-04-28

## 2019-04-24 RX ORDER — BALSALAZIDE DISODIUM 750 MG/1
2250 CAPSULE ORAL THREE TIMES A DAY
Qty: 0 | Refills: 0 | Status: DISCONTINUED | OUTPATIENT
Start: 2019-04-24 | End: 2019-04-28

## 2019-04-24 RX ORDER — HALOPERIDOL DECANOATE 100 MG/ML
0.5 INJECTION INTRAMUSCULAR EVERY 6 HOURS
Qty: 0 | Refills: 0 | Status: DISCONTINUED | OUTPATIENT
Start: 2019-04-24 | End: 2019-04-24

## 2019-04-24 RX ADMIN — Medication 1 MILLIGRAM(S): at 19:02

## 2019-04-24 RX ADMIN — HEPARIN SODIUM 5000 UNIT(S): 5000 INJECTION INTRAVENOUS; SUBCUTANEOUS at 18:59

## 2019-04-24 RX ADMIN — Medication 25 MILLIGRAM(S): at 06:51

## 2019-04-24 RX ADMIN — Medication 1 DROP(S): at 22:42

## 2019-04-24 RX ADMIN — Medication 1 TABLET(S): at 06:51

## 2019-04-24 RX ADMIN — BALSALAZIDE DISODIUM 2250 MILLIGRAM(S): 750 CAPSULE ORAL at 21:11

## 2019-04-24 RX ADMIN — DULOXETINE HYDROCHLORIDE 60 MILLIGRAM(S): 30 CAPSULE, DELAYED RELEASE ORAL at 21:07

## 2019-04-24 RX ADMIN — HEPARIN SODIUM 5000 UNIT(S): 5000 INJECTION INTRAVENOUS; SUBCUTANEOUS at 06:51

## 2019-04-24 RX ADMIN — SERTRALINE 50 MILLIGRAM(S): 25 TABLET, FILM COATED ORAL at 19:01

## 2019-04-24 RX ADMIN — ATORVASTATIN CALCIUM 20 MILLIGRAM(S): 80 TABLET, FILM COATED ORAL at 21:10

## 2019-04-24 RX ADMIN — Medication 50 MICROGRAM(S): at 05:26

## 2019-04-24 RX ADMIN — Medication 1 DROP(S): at 21:11

## 2019-04-24 NOTE — BEHAVIORAL HEALTH ASSESSMENT NOTE - CASE SUMMARY
Pt is a 89 yo   female, with adult children, domiciled alone in a private residence in [place], with a HHA 3 days a week, with PPHx of unspecified depressive d/o, no inpatient psychiatric hospitalizations, no h/o SA/SIB, no h/o substance abuse, with PMHx of UC, HTN, HLD, hypothyroidism, GERD, hx of falls coming in for acute mental status change since Monday.  Psychiatry consulted to assess for delirium and AMS.  Pt confused, poor historian, unable to explain recent events, slightly lethargic. pt c/o back pain, no si/hi. rec haldol 0.5mg po/iv q6hr prn severe agitation , no 1:1 needed.

## 2019-04-24 NOTE — BEHAVIORAL HEALTH ASSESSMENT NOTE - NSBHCHARTREVIEWIMAGING_PSY_A_CORE FT
< from: CT Head No Cont (04.23.19 @ 14:36) >    EXAM:  CT BRAIN                          PROCEDURE DATE:  04/23/2019      INTERPRETATION:  HISTORY: Confusion/delirium.    Technique: CT of the head was performed without intravenous contrast.    Multiple contiguous axial images were acquired from the skullbase to the   vertex without the administration of intravenous contrast.  Coronal and   sagittal reformations were made.    COMPARISON: CT head 11/11/2018    FINDINGS:  No acute intracranial hemorrhage, mass effect or midline shift. No CT   evidence of acute territorial infarct.    The ventricles and sulci are within normal limits for the patient's age   without evidence of hydrocephalus. Mild patchy periventricular and   subcortical white matter hypodensities, compatible with chronic   microvascular changes.    The calvarium is intact. Opacification of the right maxillary sinus with   associated wall thickening, compatible with chronic sinusitis. Mild   mucosal thickening of the right ethmoid sinus. The visualized   intraorbital compartments, remaining paranasal sinuses and mastoid   complexes are free of acute disease.    IMPRESSION:  No acute intracranial hemorrhage, mass effect or midline shift. No CT   evidence of acute territorial infarct.    < end of copied text >

## 2019-04-24 NOTE — PHYSICAL THERAPY INITIAL EVALUATION ADULT - PERTINENT HX OF CURRENT PROBLEM, REHAB EVAL
89y/o F coming in for AMS change since Monday. History provided by pt's daughter. Pt is acutely confused since Monday, answering her door in her undergarment, bringing down her walker from the second floor, confusing her radio for her telephone. Pt also complaining of hip pain since Friday. There is concern pt may have had a fall.

## 2019-04-24 NOTE — BEHAVIORAL HEALTH ASSESSMENT NOTE - NSBHCHARTREVIEWVS_PSY_A_CORE FT
Vital Signs Last 24 Hrs  T(C): 36.9 (24 Apr 2019 07:30), Max: 37.5 (23 Apr 2019 13:09)  T(F): 98.4 (24 Apr 2019 07:30), Max: 99.5 (23 Apr 2019 13:09)  HR: 95 (24 Apr 2019 07:30) (70 - 108)  BP: 158/87 (24 Apr 2019 07:30) (131/73 - 170/74)  BP(mean): --  RR: 18 (24 Apr 2019 07:30) (15 - 18)  SpO2: 93% (24 Apr 2019 07:30) (93% - 96%)

## 2019-04-24 NOTE — PHYSICAL THERAPY INITIAL EVALUATION ADULT - PRECAUTIONS/LIMITATIONS, REHAB EVAL
fall precautions/When questioned, pt pleasantly confused (states her gown is black), pt not aware of anything wrong with her but reports +abd pain, hip pain and burning and blood in her urine.CXR (-) CTHead (-) XRayPelvis (-)

## 2019-04-24 NOTE — PROGRESS NOTE ADULT - SUBJECTIVE AND OBJECTIVE BOX
no  cp/sob    REVIEW OF SYSTEMS:  GEN: no fever,    no chills  RESP: no SOB,   no cough  CVS: no chest pain,   no palpitations  GI: no abdominal pain,   no nausea,   no vomiting,   no constipation,   no diarrhea  : no dysuria,   no frequency  NEURO: no headache,   no dizziness  PSYCH: no depression,   not anxious  Derm : no rash    MEDICATIONS  (STANDING):  atorvastatin 20 milliGRAM(s) Oral at bedtime  DULoxetine 60 milliGRAM(s) Oral daily  folic acid 1 milliGRAM(s) Oral daily  heparin  Injectable 5000 Unit(s) SubCutaneous every 12 hours  lactobacillus acidophilus 1 Tablet(s) Oral two times a day  levothyroxine 50 MICROGram(s) Oral daily  metoprolol succinate ER 25 milliGRAM(s) Oral daily  sertraline 50 milliGRAM(s) Oral daily    MEDICATIONS  (PRN):      Vital Signs Last 24 Hrs  T(C): 36.9 (2019 07:30), Max: 37.5 (2019 13:09)  T(F): 98.4 (2019 07:30), Max: 99.5 (2019 13:09)  HR: 95 (2019 07:30) (70 - 108)  BP: 158/87 (2019 07:30) (131/73 - 170/74)  BP(mean): --  RR: 18 (2019 07:30) (15 - 18)  SpO2: 93% (2019 07:30) (93% - 96%)  CAPILLARY BLOOD GLUCOSE        I&O's Summary      PHYSICAL EXAM:  HEAD:  Atraumatic, Normocephalic  NECK: Supple, No   JVD  CHEST/LUNG:   no     rales,     no,    rhonchi  HEART: Regular rate and rhythm;         murmur  ABDOMEN: Soft, Nontender, ;   EXTREMITIES:  no      edema  NEUROLOGY:  alert    LABS:                        12.9   11.7  )-----------( 257      ( 2019 13:24 )             38.5     04-23    138  |  99  |  28<H>  ----------------------------<  142<H>  4.2   |  22  |  0.62    Ca    9.5      2019 13:24    TPro  7.7  /  Alb  4.2  /  TBili  0.4  /  DBili  x   /  AST  32  /  ALT  19  /  AlkPhos  105            Urinalysis Basic - ( 2019 13:24 )    Color: Dark Yellow / Appearance: Clear / S.034 / pH: x  Gluc: x / Ketone: Negative  / Bili: Negative / Urobili: Negative   Blood: x / Protein: 30 mg/dL / Nitrite: Negative   Leuk Esterase: Small / RBC: 4 /hpf / WBC 2 /HPF   Sq Epi: x / Non Sq Epi: 4 /hpf / Bacteria: Negative           @ 19:56  4.4  39      Thyroid Stimulating Hormone, Serum: 1.33 uIU/mL ( @ 17:11)          Consultant(s) Notes Reviewed:      Care Discussed with Consultants/Other Providers: no  cp/sob  in er/  calm   knows  shes in hosp and name  of president  iv site, redness, right  arm    REVIEW OF SYSTEMS:  GEN: no fever,    no chills  RESP: no SOB,   no cough  CVS: no chest pain,   no palpitations  GI: no abdominal pain,   no nausea,   no vomiting,   no constipation,   no diarrhea  : no dysuria,   no frequency  NEURO: no headache,   no dizziness  PSYCH: no depression,   not anxious  Derm : no rash    MEDICATIONS  (STANDING):  atorvastatin 20 milliGRAM(s) Oral at bedtime  DULoxetine 60 milliGRAM(s) Oral daily  folic acid 1 milliGRAM(s) Oral daily  heparin  Injectable 5000 Unit(s) SubCutaneous every 12 hours  lactobacillus acidophilus 1 Tablet(s) Oral two times a day  levothyroxine 50 MICROGram(s) Oral daily  metoprolol succinate ER 25 milliGRAM(s) Oral daily  sertraline 50 milliGRAM(s) Oral daily    MEDICATIONS  (PRN):      Vital Signs Last 24 Hrs  T(C): 36.9 (2019 07:30), Max: 37.5 (2019 13:09)  T(F): 98.4 (2019 07:30), Max: 99.5 (2019 13:09)  HR: 95 (2019 07:30) (70 - 108)  BP: 158/87 (2019 07:30) (131/73 - 170/74)  BP(mean): --  RR: 18 (2019 07:30) (15 - 18)  SpO2: 93% (2019 07:30) (93% - 96%)  CAPILLARY BLOOD GLUCOSE        I&O's Summary      PHYSICAL EXAM:  HEAD:  Atraumatic, Normocephalic  NECK: Supple, No   JVD  CHEST/LUNG:   no     rales,     no,    rhonchi  HEART: Regular rate and rhythm;         murmur  ABDOMEN: Soft, Nontender, ;   EXTREMITIES:  no      edema  NEUROLOGY:  alert    LABS:                        12.9   11.7  )-----------( 257      ( 2019 13:24 )             38.5     04-    138  |  99  |  28<H>  ----------------------------<  142<H>  4.2   |  22  |  0.62    Ca    9.5      2019 13:24    TPro  7.7  /  Alb  4.2  /  TBili  0.4  /  DBili  x   /  AST  32  /  ALT  19  /  AlkPhos  105            Urinalysis Basic - ( 2019 13:24 )    Color: Dark Yellow / Appearance: Clear / S.034 / pH: x  Gluc: x / Ketone: Negative  / Bili: Negative / Urobili: Negative   Blood: x / Protein: 30 mg/dL / Nitrite: Negative   Leuk Esterase: Small / RBC: 4 /hpf / WBC 2 /HPF   Sq Epi: x / Non Sq Epi: 4 /hpf / Bacteria: Negative           @ 19:56  4.4  39      Thyroid Stimulating Hormone, Serum: 1.33 uIU/mL ( @ 17:11)          Consultant(s) Notes Reviewed:      Care Discussed with Consultants/Other Providers:

## 2019-04-24 NOTE — PHYSICAL THERAPY INITIAL EVALUATION ADULT - DISCHARGE DISPOSITION, PT EVAL
assist/supervision for all functional activity (daughter in agreement to need of supervision)/home w/ home PT

## 2019-04-24 NOTE — PHYSICAL THERAPY INITIAL EVALUATION ADULT - BALANCE TRAINING, PT EVAL
GOAL: Pt will increase static/dynamic standing balance by 1/2 grade in 2wks to decrease fall risk and increase independence with ADLs .

## 2019-04-24 NOTE — BEHAVIORAL HEALTH ASSESSMENT NOTE - SUMMARY
Pt is a 89 yo   female, with adult children, domiciled alone in a private residence in [place], with a HHA 3 days a week, with PPHx of unspecified depressive d/o, no inpatient psychiatric hospitalizations, no h/o SA/SIB, no h/o substance abuse, with PMHx of UC, HTN, HLD, hypothyroidism, GERD, hx of falls coming in for acute mental status change since Monday.  Psychiatry consulted to assess for delirium and AMS.  Patient seen and evaluated, irritable unable to state why she is in the hospital, states "I was taken out of my home", refusing to speak with writer, not combative.  Per family, patient has had an acute change in mental status, acting bizarrely, unable to recall her phone number, more confused compared to her baseline since Monday.  They report patient has been having VH while in the hospital, seeing family members in th TV, seeing babies.  They report patient has "normal age memory issues", deny patient has ever been diagnosed with dementia.  Patient's presentation consistent with delirium.  Would recommend medical workup to determine if AMS is due to organic causes.

## 2019-04-24 NOTE — BEHAVIORAL HEALTH ASSESSMENT NOTE - DETAILS
patient's son has Parkinson's patient is a Holocaust survivor lower back pain patient's son has Parkinson's disease

## 2019-04-24 NOTE — BEHAVIORAL HEALTH ASSESSMENT NOTE - NSBHCHARTREVIEWLAB_PSY_A_CORE FT
12.9   11.7  )-----------( 257      ( 2019 13:24 )             38.5     04-23    138  |  99  |  28<H>  ----------------------------<  142<H>  4.2   |  22  |  0.62    Ca    9.5      2019 13:24    TPro  7.7  /  Alb  4.2  /  TBili  0.4  /  DBili  x   /  AST  32  /  ALT  19  /  AlkPhos  105  04-23    Urinalysis Basic - ( 2019 13:24 )    Color: Dark Yellow / Appearance: Clear / S.034 / pH: x  Gluc: x / Ketone: Negative  / Bili: Negative / Urobili: Negative   Blood: x / Protein: 30 mg/dL / Nitrite: Negative   Leuk Esterase: Small / RBC: 4 /hpf / WBC 2 /HPF   Sq Epi: x / Non Sq Epi: 4 /hpf / Bacteria: Negative

## 2019-04-24 NOTE — PROGRESS NOTE ADULT - ASSESSMENT
89 yo female     PMH,   UC,  HTN , HLD,  hypothyroidism, GERD, hx of falls       admitted   for acute mental status change since Monday     Pt is acutely confused since Monday, answering her door in her undergarment, , confusing her radio for her telephone.     .  , pt pleasantly confused    dementia/ Alzheimer's  blood / urine  c/s. pending  follow  labs  today  PT  tangela 89 yo female     PMH,   UC,  HTN , HLD,  hypothyroidism, GERD, hx of falls       admitted   for acute mental status change since Monday     Pt is acutely confused since Monday, per  daughter  answering her door in her undergarment, , confusing her radio for her telephone.     .  , pt pleasantly confused    ct head/  cxr, normal  dementia/ Alzheimer's/  b12/ folate/ tsh, normal  blood / urine  c/s. pending  follow  labs  today  PT  eval  plan   remove  iv/ redness  at  iv site  psych eval/ PT eval   spoke   with  daughter

## 2019-04-24 NOTE — BEHAVIORAL HEALTH ASSESSMENT NOTE - RISK ASSESSMENT
Risk factors: acute/chronic medical conditions, acute/chronic cognitive impairments, chronic pain, not receiving treatment, psychosis sx    Protective factors: no h/o SA/SIB, no h/o psych admissions, no access to weapons, no active substance abuse, with adult children, domiciled, social supports, positive therapeutic relationship, engaged in treatment, compliant with treatment    Overall, pt is a low risk of harm to self/others and does not require psychiatric admission for safety and stabilization.

## 2019-04-24 NOTE — BEHAVIORAL HEALTH ASSESSMENT NOTE - NSBHCHARTREVIEWINVESTIGATE_PSY_A_CORE FT
< from: 12 Lead ECG (04.23.19 @ 13:28) >      Ventricular Rate 95 BPM    Atrial Rate 95 BPM    P-R Interval 140 ms    QRS Duration 74 ms    Q-T Interval 354 ms    QTC Calculation(Bezet) 444 ms    P Axis 61 degrees    R Axis -7 degrees    T Axis 6 degrees    Diagnosis Line NORMAL SINUS RHYTHM  POSSIBLE LEFT ATRIAL ENLARGEMENT  BORDERLINE ECG    Confirmed by ATTENDING, ED ([id number]),  HARJINDER DONALD ([id number]) on 4/24/2019 6:12:37 AM    < end of copied text >

## 2019-04-24 NOTE — BEHAVIORAL HEALTH ASSESSMENT NOTE - HPI (INCLUDE ILLNESS QUALITY, SEVERITY, DURATION, TIMING, CONTEXT, MODIFYING FACTORS, ASSOCIATED SIGNS AND SYMPTOMS)
Pt is a 89 yo   female, with adult children, domiciled alone in a private residence in [place], with a HHA 3 days a week, with PPHx of unspecified depressive d/o, no inpatient psychiatric hospitalizations, no h/o SA/SIB, no h/o substance abuse, with PMHx of UC, HTN, HLD, hypothyroidism, GERD, hx of falls coming in for acute mental status change since Monday.  Psychiatry consulted to assess for delirium and AMS.    Patient seen and evaluated, awake and alert, sitting up in hospital stretcher, irritable, able to state her name, that she is currently in the hospital (however can not state which hospital), unable to state the current year and month, states "I was taken out of my home" and points to her children.  Does not know why she is in the hospital, states she has lower sciatic pain, which she reports began about a week ago but unable to state cause of this pain.  Reports she lives by herself in a 3 story home, states she has an aide 3 days per week.  Irritable, not willing to converse with writer, cites she want to go home and that nothing is wrong with her.    With patient's permission, spoke with her daughter, Tyra Natarajan ([phone]) and son in law Ryne Natarajan, who reports patient's current presentation is an acute change.  Reports patient has never been formally diagnosed with dementia, reports that patient has had "normal age declines".  They state that patient lives independently in a home with a HHA Mon-Thurs.  They reports on Friday that patient began c/o lower back pain, near the tailbone area, reports that patient at that time was cognitively at her baseline.  They reports on Monday morning, daughter went to see patient in her home in the morning, answered the door in her underwear and top and shoes on.  They report unusual things in the home such as the phone ringing and patient picks up the radio to answer.  They also note that patient could not know how to dial the phone, they also report finding her dentures in the bathtub and finding one of her rolling walkers that normally on another floor on the main floor, daughter unsure if patient might have dragged the walker down the steps and possibly fell.  Daughter reports cancelling patient's podiatrist appointment on Monday and when informing the patient, patient became irrate, irritable, stating "how could you do this! I'm going to commit suicide."  Reports all of these unsusal behaviors are not patients baseline, they deny that patient has ever endorsed suicidality or had any h/o SA in the past.   Family reports that patient's HHA stayed with patient from Monday-Tues, who had also noted patient to be altered and behaving unusually.  HHA had reported that agatha thought a tap light at bedside was apple sauce and again continued answer Pt is a 89 yo   female, with adult children, domiciled alone in a private residence in [place], with a HHA 3 days a week, with PPHx of unspecified depressive d/o, no inpatient psychiatric hospitalizations, no h/o SA/SIB, no h/o substance abuse, with PMHx of UC, HTN, HLD, hypothyroidism, GERD, hx of falls coming in for acute mental status change since Monday.  Psychiatry consulted to assess for delirium and AMS.    Patient seen and evaluated, awake and alert, sitting up in hospital stretcher, irritable, able to state her name, that she is currently in the hospital (however can not state which hospital), unable to state the current year and month, states "I was taken out of my home" and points to her children.  Does not know why she is in the hospital, states she has lower sciatic pain, which she reports began about a week ago but unable to state cause of this pain.  Reports she lives by herself in a 3 story home, states she has an aide 3 days per week.  Irritable, not willing to converse with writer, cites she want to go home and that nothing is wrong with her.    With patient's permission, spoke with her daughter, Tyra Natarajan ([phone]) and son in law Ryne Natarajan, who reports patient's current presentation is an acute change.  Reports patient has never been formally diagnosed with dementia, reports that patient has had "normal age declines".  They state that patient lives independently in a home with a HHA Mon-Thurs.  They reports on Friday that patient began c/o lower back pain, near the tailbone area, reports that patient at that time was cognitively at her baseline.  They reports on Monday morning, daughter went to see patient in her home in the morning, answered the door in her underwear and top and shoes on.  They report unusual things in the home such as the phone ringing and patient picks up the radio to answer.  They also note that patient could not know how to dial the phone, they also report finding her dentures in the bathtub and finding one of her rolling walkers that normally on another floor on the main floor, daughter unsure if patient might have dragged the walker down the steps and possibly fell.  Daughter reports cancelling patient's podiatrist appointment on Monday and when informing the patient, patient became irate, irritable, stating "how could you do this! I'm going to commit suicide."  Reports all of these unusual behaviors are not patients baseline, they deny that patient has ever endorsed suicidality or had any h/o SA in the past.   Family reports that patient's HHA stayed with patient from Monday-Tues, who had also noted patient to be altered and behaving unusually.  HHA had reported that patient thought a tap light at bedside was apple sauce and again continued answer the phone by picking up the radio, family continued to become concerned, brought patient to ED as advised by patient's PCP.  They report while in the ED, that patient thought she saw family members in the television, reports that patient had endorsed "seeing babies" in the ER, they also report patient believed that the box of gloves on the wall was a straw.  They report patient can not recall her telephone number and the current date at this time which is not her baseline.  Daughter reports patient has never seen a psychiatrist before, report patient may have a remote h/o seeing a psychiatrist following the Holocaust (patient is a Holocaust survivor), however currently is not in psychiatric treatment, reports she was started on Zoloft "to take the edge off" by her PCP, has been on it for several years.  She also reports that she was started on Cymbalta for pain symptoms from her arthritis.  They deny patient has ever used illicit substances, they deny h/o SA, report patient had made statements about committing suicide on Monday they believe to be out of frustration, they have no concerns for her safety that she would intentionally harm herself.

## 2019-04-24 NOTE — PHYSICAL THERAPY INITIAL EVALUATION ADULT - IMPAIRMENTS FOUND, PT EVAL
aerobic capacity/endurance/gait, locomotion, and balance/muscle strength/arousal, attention, and cognition

## 2019-04-25 LAB
24R-OH-CALCIDIOL SERPL-MCNC: 27.4 NG/ML — LOW (ref 30–80)
T PALLIDUM AB TITR SER: NEGATIVE — SIGNIFICANT CHANGE UP

## 2019-04-25 PROCEDURE — 99232 SBSQ HOSP IP/OBS MODERATE 35: CPT

## 2019-04-25 RX ORDER — PREDNISOLONE SODIUM PHOSPHATE 1 %
1 DROPS OPHTHALMIC (EYE)
Qty: 0 | Refills: 0 | Status: DISCONTINUED | OUTPATIENT
Start: 2019-04-25 | End: 2019-04-28

## 2019-04-25 RX ADMIN — BALSALAZIDE DISODIUM 2250 MILLIGRAM(S): 750 CAPSULE ORAL at 14:53

## 2019-04-25 RX ADMIN — Medication 1 DROP(S): at 12:43

## 2019-04-25 RX ADMIN — Medication 1 DROP(S): at 20:09

## 2019-04-25 RX ADMIN — Medication 1 MILLIGRAM(S): at 12:44

## 2019-04-25 RX ADMIN — Medication 25 MILLIGRAM(S): at 08:21

## 2019-04-25 RX ADMIN — BALSALAZIDE DISODIUM 2250 MILLIGRAM(S): 750 CAPSULE ORAL at 08:48

## 2019-04-25 RX ADMIN — Medication 1 DROP(S): at 05:59

## 2019-04-25 RX ADMIN — Medication 1 DROP(S): at 10:27

## 2019-04-25 RX ADMIN — Medication 1 DROP(S): at 10:26

## 2019-04-25 RX ADMIN — Medication 1 DROP(S): at 17:40

## 2019-04-25 RX ADMIN — BALSALAZIDE DISODIUM 2250 MILLIGRAM(S): 750 CAPSULE ORAL at 21:12

## 2019-04-25 RX ADMIN — Medication 50 MICROGRAM(S): at 05:58

## 2019-04-25 RX ADMIN — HEPARIN SODIUM 5000 UNIT(S): 5000 INJECTION INTRAVENOUS; SUBCUTANEOUS at 05:57

## 2019-04-25 RX ADMIN — HEPARIN SODIUM 5000 UNIT(S): 5000 INJECTION INTRAVENOUS; SUBCUTANEOUS at 17:40

## 2019-04-25 RX ADMIN — SERTRALINE 50 MILLIGRAM(S): 25 TABLET, FILM COATED ORAL at 12:44

## 2019-04-25 RX ADMIN — Medication 1 DROP(S): at 05:58

## 2019-04-25 RX ADMIN — Medication 1 DROP(S): at 22:00

## 2019-04-25 RX ADMIN — DULOXETINE HYDROCHLORIDE 60 MILLIGRAM(S): 30 CAPSULE, DELAYED RELEASE ORAL at 12:44

## 2019-04-25 RX ADMIN — Medication 1 DROP(S): at 14:54

## 2019-04-25 RX ADMIN — Medication 1 DROP(S): at 01:34

## 2019-04-25 NOTE — PROGRESS NOTE BEHAVIORAL HEALTH - NSBHCHARTREVIEWINVESTIGATE_PSY_A_CORE FT
< from: 12 Lead ECG (04.23.19 @ 13:28) >      Ventricular Rate 95 BPM    Atrial Rate 95 BPM    P-R Interval 140 ms    QRS Duration 74 ms    Q-T Interval 354 ms    QTC Calculation(Bezet) 444 ms    P Axis 61 degrees    R Axis -7 degrees    T Axis 6 degrees    Diagnosis Line NORMAL SINUS RHYTHM  POSSIBLE LEFT ATRIAL ENLARGEMENT  BORDERLINE ECG    Confirmed by ATTENDING, ED ([id number]),  HARJINDER DONALD ([id number]) on 4/24/2019 6:12:37 AM    < end of copied text >

## 2019-04-25 NOTE — PROGRESS NOTE BEHAVIORAL HEALTH - NSBHFUPINTERVALHXFT_PSY_A_CORE
Pt denies pain, c/o not being treated properly here. Pt reports poor sleep, fair appetite. pt denies confusion, psychosis. Pt denies si/hi.

## 2019-04-25 NOTE — PROGRESS NOTE ADULT - SUBJECTIVE AND OBJECTIVE BOX
INTERVAL HPI/OVERNIGHT EVENTS:  Pt seen and examined at bedside.     Allergies/Intolerance: No Known Allergies      MEDICATIONS  (STANDING):  artificial  tears Solution 1 Drop(s) Both EYES every 4 hours  atorvastatin 20 milliGRAM(s) Oral at bedtime  balsalazide 2250 milliGRAM(s) Oral three times a day  DULoxetine 60 milliGRAM(s) Oral daily  folic acid 1 milliGRAM(s) Oral daily  heparin  Injectable 5000 Unit(s) SubCutaneous every 12 hours  lactobacillus acidophilus 1 Tablet(s) Oral two times a day  levothyroxine 50 MICROGram(s) Oral daily  melatonin 3 milliGRAM(s) Oral at bedtime  metoprolol succinate ER 25 milliGRAM(s) Oral daily  prednisoLONE acetate 1% Suspension 1 Drop(s) Both EYES daily  sertraline 50 milliGRAM(s) Oral daily  sodium chloride 5% Solution 1 Drop(s) Left EYE four times a day    MEDICATIONS  (PRN):  haloperidol    Concentrate 0.5 milliGRAM(s) Oral every 6 hours PRN Agitation        ROS: all systems reviewed and wnl      PHYSICAL EXAMINATION:  Vital Signs Last 24 Hrs  T(C): 36.9 (2019 04:03), Max: 37 (2019 20:03)  T(F): 98.5 (2019 04:03), Max: 98.6 (2019 20:03)  HR: 79 (2019 08:08) (79 - 86)  BP: 152/82 (2019 08:08) (112/68 - 152/82)  BP(mean): --  RR: 18 (2019 04:03) (17 - 18)  SpO2: 95% (2019 04:03) (94% - 95%)  CAPILLARY BLOOD GLUCOSE          - @ 07:01  -  - @ 07:00  --------------------------------------------------------  IN: 470 mL / OUT: 0 mL / NET: 470 mL        GENERAL:   NECK: supple, No JVD  CHEST/LUNG: clear to auscultation bilaterally; no rales, rhonchi, or wheezing b/l  HEART: normal S1, S2  ABDOMEN: BS+, soft, ND, NT   EXTREMITIES:  pulses palpable; no clubbing, cyanosis, or edema b/l LEs  SKIN: no rashes or lesions      LABS:                        12.9   11.7  )-----------( 257      ( 2019 13:24 )             38.5     -    138  |  99  |  28<H>  ----------------------------<  142<H>  4.2   |  22  |  0.62    Ca    9.5      2019 13:24    TPro  7.7  /  Alb  4.2  /  TBili  0.4  /  DBili  x   /  AST  32  /  ALT  19  /  AlkPhos  105  04-      Urinalysis Basic - ( 2019 13:24 )    Color: Dark Yellow / Appearance: Clear / S.034 / pH: x  Gluc: x / Ketone: Negative  / Bili: Negative / Urobili: Negative   Blood: x / Protein: 30 mg/dL / Nitrite: Negative   Leuk Esterase: Small / RBC: 4 /hpf / WBC 2 /HPF   Sq Epi: x / Non Sq Epi: 4 /hpf / Bacteria: Negative INTERVAL HPI/OVERNIGHT EVENTS:  Pt seen and examined at bedside.     Allergies/Intolerance: No Known Allergies      MEDICATIONS  (STANDING):  artificial  tears Solution 1 Drop(s) Both EYES every 4 hours  atorvastatin 20 milliGRAM(s) Oral at bedtime  balsalazide 2250 milliGRAM(s) Oral three times a day  DULoxetine 60 milliGRAM(s) Oral daily  folic acid 1 milliGRAM(s) Oral daily  heparin  Injectable 5000 Unit(s) SubCutaneous every 12 hours  lactobacillus acidophilus 1 Tablet(s) Oral two times a day  levothyroxine 50 MICROGram(s) Oral daily  melatonin 3 milliGRAM(s) Oral at bedtime  metoprolol succinate ER 25 milliGRAM(s) Oral daily  prednisoLONE acetate 1% Suspension 1 Drop(s) Both EYES daily  sertraline 50 milliGRAM(s) Oral daily  sodium chloride 5% Solution 1 Drop(s) Left EYE four times a day    MEDICATIONS  (PRN):  haloperidol    Concentrate 0.5 milliGRAM(s) Oral every 6 hours PRN Agitation        ROS: all systems reviewed and wnl      PHYSICAL EXAMINATION:  Vital Signs Last 24 Hrs  T(C): 36.9 (2019 04:03), Max: 37 (2019 20:03)  T(F): 98.5 (2019 04:03), Max: 98.6 (2019 20:03)  HR: 79 (2019 08:08) (79 - 86)  BP: 152/82 (2019 08:08) (112/68 - 152/82)  BP(mean): --  RR: 18 (2019 04:03) (17 - 18)  SpO2: 95% (2019 04:03) (94% - 95%)  CAPILLARY BLOOD GLUCOSE          - @ 07:01  -  - @ 07:00  --------------------------------------------------------  IN: 470 mL / OUT: 0 mL / NET: 470 mL        GENERAL: comfortable, no new complaints  NECK: supple, No JVD  CHEST/LUNG: clear to auscultation bilaterally; no rales, rhonchi, or wheezing b/l  HEART: normal S1, S2  ABDOMEN: BS+, soft, ND, NT   EXTREMITIES:  pulses palpable; no clubbing, cyanosis, or edema b/l LEs  SKIN: no rashes or lesions      LABS:                        12.9   11.7  )-----------( 257      ( 2019 13:24 )             38.5         138  |  99  |  28<H>  ----------------------------<  142<H>  4.2   |  22  |  0.62    Ca    9.5      2019 13:24    TPro  7.7  /  Alb  4.2  /  TBili  0.4  /  DBili  x   /  AST  32  /  ALT  19  /  AlkPhos  105        Urinalysis Basic - ( 2019 13:24 )    Color: Dark Yellow / Appearance: Clear / S.034 / pH: x  Gluc: x / Ketone: Negative  / Bili: Negative / Urobili: Negative   Blood: x / Protein: 30 mg/dL / Nitrite: Negative   Leuk Esterase: Small / RBC: 4 /hpf / WBC 2 /HPF   Sq Epi: x / Non Sq Epi: 4 /hpf / Bacteria: Negative

## 2019-04-25 NOTE — PROGRESS NOTE BEHAVIORAL HEALTH - NSBHCONSULTMEDPRN_PSY_A_CORE
----- Message from Jesse GA MD sent at 10/15/2018  5:02 PM EDT -----  Regarding: Esophagram results  Okay to call results, recommend schedule EGD to further assess as recommended by the esophagram report.  ----- Message -----  From: Interface, Rad Results [place] In  Sent: 10/12/2018   2:44 PM  To: Jesse GA MD         yes

## 2019-04-25 NOTE — PROGRESS NOTE BEHAVIORAL HEALTH - SUMMARY
Pt is a 89 yo   female, with adult children, domiciled alone in a private residence in [place], with a HHA 3 days a week, with PPHx of unspecified depressive d/o, no inpatient psychiatric hospitalizations, no h/o SA/SIB, no h/o substance abuse, with PMHx of UC, HTN, HLD, hypothyroidism, GERD, hx of falls coming in for acute mental status change since Monday.  Psychiatry consulted to assess for delirium and AMS.  Patient seen and evaluated, irritable unable to state why she is in the hospital, states "I was taken out of my home", refusing to speak with writer, not combative.  Per family, patient has had an acute change in mental status, acting bizarrely, unable to recall her phone number, more confused compared to her baseline since Monday.  They report patient has been having VH while in the hospital, seeing family members in th TV, seeing babies.  They report patient has "normal age memory issues", deny patient has ever been diagnosed with dementia.  Patient's presentation consistent with delirium.  Would recommend medical workup to determine if AMS is due to organic causes.

## 2019-04-25 NOTE — PROGRESS NOTE ADULT - ASSESSMENT
RN accessed port using sterile technique. Blood return noted and labs drawn as ordered. Port citrate locked and deaccessed per protocol.    Pt is a 89 yo female PMH UC, HTN, HLD, hypothyroidism, GERD, hx of falls coming in for acute mental status change since Monday. History provided by pt's daughter. Pt is acutely confused since Monday, answering her door in her undergarment, bringing down her walker from the second floor (she normally has walkers in every floor), confusing her radio for her telephone. Patient also complaining of hip pain since Friday. There is concern patient may have had a fall. When questioned, pt pleasantly confused (states her gown is black), pt not aware of anything wrong with her but reports +abd pain, hip pain and burning and blood in her urine.    Neuro: Altered mental status, no clear cause. CT brain no acute findings. UA, CXR all negative for infection. Labs all WNL. Will order B-12, folate,   TSH in AM. Doubt need for MRI of brain. PT eval requested.     CV: Continue Toprol XL 25 mg/day and Lipitor 20 mg/day.      Psych: Continue Zoloft 50 mg/day and Cymbalta 60 mg/day. Psych consult for today.      Endo: Continue Synthyroid 50 mcg/day, TSH in AM. Pt is a 89 yo female PMH UC, HTN, HLD, hypothyroidism, GERD, hx of falls coming in for acute mental status change since Monday. History provided by pt's daughter. Pt is acutely confused since Monday, answering her door in her undergarment, bringing down her walker from the second floor (she normally has walkers in every floor), confusing her radio for her telephone. Patient also complaining of hip pain since Friday. There is concern patient may have had a fall. When questioned, pt pleasantly confused (states her gown is black), pt not aware of anything wrong with her but reports +abd pain, hip pain and burning and blood in her urine.    Neuro: Altered mental status, no clear cause. CT brain no acute findings. UA, CXR all negative for infection. Labs all WNL. B-12 normal 943,  folate > 20 is normal. TSH normal .96. Doubt need for MRI of brain. PT eval requested. Neurology consult called today.     CV: Continue Toprol XL 25 mg/day and Lipitor 20 mg/day.      Psych: Continue Zoloft 50 mg/day and Cymbalta 60 mg/day. Psych consult for today.      Endo: Continue Synthyroid 50 mcg/day, TSH as above at goal.

## 2019-04-25 NOTE — PROGRESS NOTE BEHAVIORAL HEALTH - NSBHCHARTREVIEWVS_PSY_A_CORE FT
Vital Signs Last 24 Hrs  T(C): 36.9 (25 Apr 2019 04:03), Max: 37 (24 Apr 2019 20:03)  T(F): 98.5 (25 Apr 2019 04:03), Max: 98.6 (24 Apr 2019 20:03)  HR: 79 (25 Apr 2019 08:08) (79 - 86)  BP: 152/82 (25 Apr 2019 08:08) (112/68 - 152/82)  BP(mean): --  RR: 18 (25 Apr 2019 04:03) (17 - 18)  SpO2: 95% (25 Apr 2019 04:03) (94% - 95%)

## 2019-04-25 NOTE — PROGRESS NOTE BEHAVIORAL HEALTH - NSBHCHARTREVIEWLAB_PSY_A_CORE FT
12.9   11.7  )-----------( 257      ( 23 Apr 2019 13:24 )             38.5     04-23    138  |  99  |  28<H>  ----------------------------<  142<H>  4.2   |  22  |  0.62    Ca    9.5      23 Apr 2019 13:24    TPro  7.7  /  Alb  4.2  /  TBili  0.4  /  DBili  x   /  AST  32  /  ALT  19  /  AlkPhos  105  04-23

## 2019-04-25 NOTE — CONSULT NOTE ADULT - SUBJECTIVE AND OBJECTIVE BOX
[facility] [place] [facility]), [facility]        Patient is a 89y old  Female who presents with a chief complaint of altered mental status (25 Apr 2019 10:15)      HPI:  Pt is a 89 yo female PMH UC, HTN, HLD, hypothyroidism, GERD, hx of falls coming in for acute mental status change since Monday. History provided by pt's daughter. Pt is acutely confused since Monday, answering her door in her undergarment, bringing down her walker from the second floor (she normally has walkers in every floor), confusing her radio for her telephone. Patient also complaining of hip pain since Friday. There is concern patient may have had a fall. When questioned, pt pleasantly confused (states her gown is black), pt not aware of anything wrong with her but reports +abd pain, hip pain and burning and blood in her urine. (23 Apr 2019 20:39)           *****PAST MEDICAL / Surgical  HISTORY:  PAST MEDICAL & SURGICAL HISTORY:  HLD (hyperlipidemia)  GERD (gastroesophageal reflux disease)  HTN (hypertension)  Ulcerative colitis  Hypothyroid  No significant past surgical history           *****FAMILY HISTORY:  FAMILY HISTORY:           *****SOCIAL HISTORY:  Alcohol: None  Smoking: None         *****ALLERGIES:   Allergies    No Known Allergies    Intolerances             *****MEDICATIONS: current medication reviewed and documented.   MEDICATIONS  (STANDING):  artificial  tears Solution 1 Drop(s) Both EYES every 4 hours  atorvastatin 20 milliGRAM(s) Oral at bedtime  balsalazide 2250 milliGRAM(s) Oral three times a day  DULoxetine 60 milliGRAM(s) Oral daily  folic acid 1 milliGRAM(s) Oral daily  heparin  Injectable 5000 Unit(s) SubCutaneous every 12 hours  lactobacillus acidophilus 1 Tablet(s) Oral two times a day  levothyroxine 50 MICROGram(s) Oral daily  melatonin 3 milliGRAM(s) Oral at bedtime  metoprolol succinate ER 25 milliGRAM(s) Oral daily  prednisoLONE acetate 1% Suspension 1 Drop(s) Both EYES two times a day  sertraline 50 milliGRAM(s) Oral daily  sodium chloride 5% Solution 1 Drop(s) Left EYE four times a day    MEDICATIONS  (PRN):  haloperidol    Concentrate 0.5 milliGRAM(s) Oral every 6 hours PRN Agitation           *****REVIEW OF SYSTEM:  GEN: no fever, no chills, no pain  RESP: no SOB, no cough, no sputum  CVS: no chest pain, no palpitations, no edema  GI: no abdominal pain, no nausea, no vomiting, no constipation, no diarrhea  : no dysurea, no frequency, no hematurea  Neuro: no headache, no dizziness  PSYCH: no anxiety, no depression  Derm : no itching, no rash         *****VITAL SIGNS:  T(C): 36.9 (04-25-19 @ 12:22), Max: 36.9 (04-25-19 @ 04:03)  HR: 77 (04-25-19 @ 12:22) (77 - 81)  BP: 132/67 (04-25-19 @ 12:22) (118/66 - 152/82)  RR: 18 (04-25-19 @ 12:22) (18 - 18)  SpO2: 96% (04-25-19 @ 12:22) (95% - 96%)  Wt(kg): --    04-24 @ 07:01  -  04-25 @ 07:00  --------------------------------------------------------  IN: 470 mL / OUT: 0 mL / NET: 470 mL    04-25 @ 07:01  -  04-25 @ 20:12  --------------------------------------------------------  IN: 860 mL / OUT: 0 mL / NET: 860 mL             *****PHYSICAL EXAM:   Alert oriented x 2.5  Attention comprehension are fair. Able to name, repeat,   without any difficulty.  unable to read due to poor vision.   Able to follow 3 step commands. recall 1/3      EOMI fundi not visualized,  VFF to confrontration  No facial asymmetry   Tongue is midline   Palate elevates symmetrically   Moving all 4 ext symmetrically no pronator drift   Reflexes are symmetric throughout   sensation is grossly symmetric  Gait : slightly wide based.   B/L down going toes               *****LAB AND IMAGING:         [All pertinent recent Imaging reports reviewed]         *****A S S E S S M E N T   A N D   P L A N :      Pt is a 89 yo female PMH UC, HTN, HLD, hypothyroidism, GERD, hx of falls coming in for acute mental status change since Monday. History provided by pt's daughter. Pt is acutely confused since Monday, answering her door in her undergarment, bringing down her walker from the second floor (she normally has walkers in every floor), confusing her radio for her telephone. Patient also complaining of hip pain since Friday. There is concern patient may have had a fall. When questioned, pt pleasantly confused (states her gown is black), pt not aware of anything wrong with her but reports +abd pain, hip pain and burning and blood in her urine  Problem/Recommendations 1:  ams likely related to poor sleep intake over the last 3-4 days.   probable underlying mild dementia that is more pronounced with sleep deprivation.   ct head no acute process.   pt is paranoid/has delusions likely with early onset of dementia   would recommend outpt neuropsych eval.   psych consult appreciated.     Problem/Recommendations 2:depression per psychiatry.          ___________________________  Will follow with you.  Thank you,  Kaitlyn Nye MD  Diplomate of the American Board of Neurology and Psychiatry.  Diplomate of the American Board of Vascular Neurology.   [facility] ([facility]   Ph: [phone]    Differential diagnosis and plan of care discussed with patient after the evaluation.   Advanced care planning options discussed.   Pain assessed and judicious use of narcotics when appropriate was discussed.  Importance of Fall prevention discussed.  Counseling on Smoking and Alcohol cessation was offered when appropriate.  Counseling on Diet, exercise, and medication compliance was done.     62 minutes spent on the total encounter;  more than 50 % of the visit was spent on counseling  and or coordinating care by the attending physician.    Thank you for allowing me to participate in the care of this leonid patient. Please do not hesitate to call me if you have any questions.     This and subsequent notes were partially created using voice recognition software and will  inherently be subject to errors including those of syntax and sound alike substitutions which may escape proofreading. In such instances original meaning may be extrapolated by contextual derivation.

## 2019-04-26 ENCOUNTER — TRANSCRIPTION ENCOUNTER (OUTPATIENT)
Age: 84
End: 2019-04-26

## 2019-04-26 PROCEDURE — 70450 CT HEAD/BRAIN W/O DYE: CPT | Mod: 26

## 2019-04-26 RX ORDER — ATORVASTATIN CALCIUM 80 MG/1
1 TABLET, FILM COATED ORAL
Qty: 30 | Refills: 0 | OUTPATIENT
Start: 2019-04-26 | End: 2019-05-25

## 2019-04-26 RX ORDER — LEVOTHYROXINE SODIUM 125 MCG
1 TABLET ORAL
Qty: 30 | Refills: 0 | OUTPATIENT
Start: 2019-04-26 | End: 2019-05-25

## 2019-04-26 RX ORDER — LANOLIN ALCOHOL/MO/W.PET/CERES
1 CREAM (GRAM) TOPICAL
Qty: 30 | Refills: 0 | OUTPATIENT
Start: 2019-04-26 | End: 2019-05-25

## 2019-04-26 RX ORDER — SERTRALINE 25 MG/1
1 TABLET, FILM COATED ORAL
Qty: 30 | Refills: 0 | OUTPATIENT
Start: 2019-04-26 | End: 2019-05-25

## 2019-04-26 RX ORDER — METOPROLOL TARTRATE 50 MG
1 TABLET ORAL
Qty: 30 | Refills: 0 | OUTPATIENT
Start: 2019-04-26 | End: 2019-05-25

## 2019-04-26 RX ORDER — ACETAMINOPHEN 500 MG
650 TABLET ORAL ONCE
Qty: 0 | Refills: 0 | Status: COMPLETED | OUTPATIENT
Start: 2019-04-26 | End: 2019-04-26

## 2019-04-26 RX ORDER — PREDNISOLONE SODIUM PHOSPHATE 1 %
1 DROPS OPHTHALMIC (EYE)
Qty: 1 | Refills: 0 | OUTPATIENT
Start: 2019-04-26 | End: 2019-05-25

## 2019-04-26 RX ORDER — DULOXETINE HYDROCHLORIDE 30 MG/1
1 CAPSULE, DELAYED RELEASE ORAL
Qty: 30 | Refills: 0 | OUTPATIENT
Start: 2019-04-26 | End: 2019-05-25

## 2019-04-26 RX ORDER — FOLIC ACID 0.8 MG
1 TABLET ORAL
Qty: 30 | Refills: 0 | OUTPATIENT
Start: 2019-04-26 | End: 2019-05-25

## 2019-04-26 RX ORDER — LACTOBACILLUS ACIDOPHILUS 100MM CELL
1 CAPSULE ORAL
Qty: 30 | Refills: 0 | OUTPATIENT
Start: 2019-04-26 | End: 2019-05-25

## 2019-04-26 RX ORDER — SODIUM CHLORIDE 5 %
1 DROPS OPHTHALMIC (EYE)
Qty: 1 | Refills: 0 | OUTPATIENT
Start: 2019-04-26

## 2019-04-26 RX ADMIN — BALSALAZIDE DISODIUM 2250 MILLIGRAM(S): 750 CAPSULE ORAL at 06:57

## 2019-04-26 RX ADMIN — Medication 1 DROP(S): at 06:41

## 2019-04-26 RX ADMIN — Medication 1 DROP(S): at 21:32

## 2019-04-26 RX ADMIN — Medication 1 TABLET(S): at 06:56

## 2019-04-26 RX ADMIN — SERTRALINE 50 MILLIGRAM(S): 25 TABLET, FILM COATED ORAL at 11:12

## 2019-04-26 RX ADMIN — Medication 650 MILLIGRAM(S): at 03:30

## 2019-04-26 RX ADMIN — Medication 1 TABLET(S): at 17:49

## 2019-04-26 RX ADMIN — ATORVASTATIN CALCIUM 20 MILLIGRAM(S): 80 TABLET, FILM COATED ORAL at 21:33

## 2019-04-26 RX ADMIN — HEPARIN SODIUM 5000 UNIT(S): 5000 INJECTION INTRAVENOUS; SUBCUTANEOUS at 06:47

## 2019-04-26 RX ADMIN — Medication 1 DROP(S): at 06:47

## 2019-04-26 RX ADMIN — Medication 1 MILLIGRAM(S): at 11:12

## 2019-04-26 RX ADMIN — Medication 1 DROP(S): at 16:19

## 2019-04-26 RX ADMIN — Medication 25 MILLIGRAM(S): at 06:56

## 2019-04-26 RX ADMIN — Medication 650 MILLIGRAM(S): at 03:00

## 2019-04-26 RX ADMIN — BALSALAZIDE DISODIUM 2250 MILLIGRAM(S): 750 CAPSULE ORAL at 21:32

## 2019-04-26 RX ADMIN — Medication 1 DROP(S): at 11:10

## 2019-04-26 RX ADMIN — Medication 1 DROP(S): at 11:15

## 2019-04-26 RX ADMIN — DULOXETINE HYDROCHLORIDE 60 MILLIGRAM(S): 30 CAPSULE, DELAYED RELEASE ORAL at 11:12

## 2019-04-26 RX ADMIN — HEPARIN SODIUM 5000 UNIT(S): 5000 INJECTION INTRAVENOUS; SUBCUTANEOUS at 17:49

## 2019-04-26 RX ADMIN — Medication 1 DROP(S): at 02:17

## 2019-04-26 RX ADMIN — Medication 50 MICROGRAM(S): at 06:42

## 2019-04-26 RX ADMIN — Medication 1 DROP(S): at 13:51

## 2019-04-26 RX ADMIN — BALSALAZIDE DISODIUM 2250 MILLIGRAM(S): 750 CAPSULE ORAL at 14:04

## 2019-04-26 NOTE — DISCHARGE NOTE PROVIDER - CARE PROVIDER_API CALL
Sean Schmitz)  Internal Medicine  [address] [place], [place] [address]  Phone: [phone]  Fax: [phone]  Follow Up Time:

## 2019-04-26 NOTE — PROGRESS NOTE ADULT - SUBJECTIVE AND OBJECTIVE BOX
[facility]      Seen earlier today, and examined.  - Today, patient is without complaints.           *****MEDICATIONS: Current medication reviewed and documented.    MEDICATIONS  (STANDING):  artificial  tears Solution 1 Drop(s) Both EYES every 4 hours  atorvastatin 20 milliGRAM(s) Oral at bedtime  balsalazide 2250 milliGRAM(s) Oral three times a day  DULoxetine 60 milliGRAM(s) Oral daily  folic acid 1 milliGRAM(s) Oral daily  heparin  Injectable 5000 Unit(s) SubCutaneous every 12 hours  lactobacillus acidophilus 1 Tablet(s) Oral two times a day  levothyroxine 50 MICROGram(s) Oral daily  melatonin 3 milliGRAM(s) Oral at bedtime  metoprolol succinate ER 25 milliGRAM(s) Oral daily  prednisoLONE acetate 1% Suspension 1 Drop(s) Both EYES two times a day  sertraline 50 milliGRAM(s) Oral daily  sodium chloride 5% Solution 1 Drop(s) Left EYE four times a day    MEDICATIONS  (PRN):  haloperidol    Concentrate 0.5 milliGRAM(s) Oral every 6 hours PRN Agitation          ***** VITAL SIGNS:  T(F): 98.2 (04-26-19 @ 04:46), Max: 98.2 (04-25-19 @ 20:59)  HR: 71 (04-26-19 @ 04:46) (70 - 71)  BP: 130/71 (04-26-19 @ 04:46) (120/70 - 130/71)  RR: 17 (04-26-19 @ 04:46) (17 - 17)  SpO2: 96% (04-26-19 @ 04:46) (96% - 97%)  Wt(kg): --  ,   I&O's Summary    25 Apr 2019 07:01  -  26 Apr 2019 07:00  --------------------------------------------------------  IN: 1400 mL / OUT: 350 mL / NET: 1050 mL             *****PHYSICAL EXAM: Alert oriented x 2.5  Attention comprehension are fair. Able to name, repeat,   without any difficulty.  unable to read due to poor vision.   Able to follow 3 step commands. recall 1/3      EOMI fundi not visualized,  VFF to confrontration  No facial asymmetry   Tongue is midline   Palate elevates symmetrically   Moving all 4 ext symmetrically no pronator drift   Reflexes are symmetric throughout   sensation is grossly symmetric  Gait : slightly wide based.   B/L down going toes          *****LAB AND IMAGING:                                         [All pertinent recent Imaging/Reports reviewed]           *****A S S E S S M E N T   A N D   P L A N : Pt is a 89 yo female PMH UC, HTN, HLD, hypothyroidism, GERD, hx of falls coming in for acute mental status change since Monday. History provided by pt's daughter. Pt is acutely confused since Monday, answering her door in her undergarment, bringing down her walker from the second floor (she normally has walkers in every floor), confusing her radio for her telephone. Patient also complaining of hip pain since Friday. There is concern patient may have had a fall. When questioned, pt pleasantly confused (states her gown is black), pt not aware of anything wrong with her but reports +abd pain, hip pain and burning and blood in her urine  Problem/Recommendations 1:  ams likely related to poor sleep intake over the last 3-4 days.   probable underlying mild dementia that is more pronounced with sleep deprivation.   ct head no acute process.   pt is paranoid/has delusions likely with early onset of dementia   would recommend outpt neuropsych eval.   psych consult appreciated.     Problem/Recommendations 2:depression per psychiatry.                  Thank you for allowing me to participate in the care of this patient. Please do not hesitate to call me if you have any  questions.        ________________  Kaitlyn Nye MD  [facility] ([facility]  [phone]      30 minutes spent on total encounter; more than 50 % of the visit was  spent counseling about plan of care, compliance to diet/exercise and medication regimen and or  coordinating care by the attending physician.      It is advised that s stroke patients follow up with NELSY Pratt @ [phone] in 1- 2 weeks.   Others please follow up with Dr. Michael Nissenbaum [phone]

## 2019-04-26 NOTE — DISCHARGE NOTE PROVIDER - NSDCCPCAREPLAN_GEN_ALL_CORE_FT
PRINCIPAL DISCHARGE DIAGNOSIS  Diagnosis: Altered mental status  Assessment and Plan of Treatment:       SECONDARY DISCHARGE DIAGNOSES  Diagnosis: HTN (hypertension)  Assessment and Plan of Treatment: Low salt diet  Activity as tolerated.  Take all medication as prescribed.  Follow up with your medical doctor for routine blood pressure monitoring at your next visit.  Notify your doctor if you have any of the following symptoms:   Dizziness, Lightheadedness, Blurry vision, Headache, Chest pain, Shortness of breath PRINCIPAL DISCHARGE DIAGNOSIS  Diagnosis: Altered mental status  Assessment and Plan of Treatment: no clear cause. CT brain repeat no acute findings. Discussed with Neurology. UA, CXR all negative for infection. Labs all WNL. B-12 normal 943,  folate > 20 is normal. TSH normal .96. No need for MRI of brain.      SECONDARY DISCHARGE DIAGNOSES  Diagnosis: HTN (hypertension)  Assessment and Plan of Treatment: Low salt diet  Activity as tolerated.  Take all medication as prescribed.  Follow up with your medical doctor for routine blood pressure monitoring at your next visit.  Notify your doctor if you have any of the following symptoms:   Dizziness, Lightheadedness, Blurry vision, Headache, Chest pain, Shortness of breath PRINCIPAL DISCHARGE DIAGNOSIS  Diagnosis: Altered mental status  Assessment and Plan of Treatment: no clear cause. CT brain repeat no acute findings. Discussed with Neurology. UA, CXR all negative for infection. Labs all WNL. B-12 normal 943,  folate > 20 is normal. TSH normal .96. No need for MRI of brain.      SECONDARY DISCHARGE DIAGNOSES  Diagnosis: Ulcerative colitis  Assessment and Plan of Treatment: stable, cont current home med    Diagnosis: HTN (hypertension)  Assessment and Plan of Treatment: Low salt diet  Activity as tolerated.  Take all medication as prescribed.  Follow up with your medical doctor for routine blood pressure monitoring at your next visit.  Notify your doctor if you have any of the following symptoms:   Dizziness, Lightheadedness, Blurry vision, Headache, Chest pain, Shortness of breath

## 2019-04-26 NOTE — DISCHARGE NOTE PROVIDER - NSDCFUADDINST_GEN_ALL_CORE_FT
Follow-up with your primary care physician within 1 week. Call for appointment.  Please bring all discharge paperwork and list of medications to all follow up appointments  Please call for follow up appoints one day after discharge

## 2019-04-26 NOTE — DISCHARGE NOTE NURSING/CASE MANAGEMENT/SOCIAL WORK - NSDCDPATPORTLINK_GEN_ALL_CORE
You can access the [facility] Portal, offered by [facility], by registering with the following website: [email or web address]

## 2019-04-26 NOTE — PROGRESS NOTE ADULT - ASSESSMENT
Pt is a 89 yo female PMH UC, HTN, HLD, hypothyroidism, GERD, hx of falls coming in for acute mental status change since Monday. History provided by pt's daughter. Pt is acutely confused since Monday, answering her door in her undergarment, bringing down her walker from the second floor (she normally has walkers in every floor), confusing her radio for her telephone. Patient also complaining of hip pain since Friday. There is concern patient may have had a fall. When questioned, pt pleasantly confused (states her gown is black), pt not aware of anything wrong with her but reports +abd pain, hip pain and burning and blood in her urine.    Neuro: Altered mental status, no clear cause. CT brain no acute findings. Discussed with Neurology, for repeat CT head today. UA, CXR all negative for infection. Labs all WNL. B-12 normal 943,  folate > 20 is normal. TSH normal .96. Doubt need for MRI of brain. PT eval requested. Discussed plan with Neurology today at bedside.      CV: Continue Toprol XL 25 mg/day and Lipitor 20 mg/day.      Psych: Continue Zoloft 50 mg/day and Cymbalta 60 mg/day. Psych consult for today.      Endo: Continue Synthyroid 50 mcg/day, TSH as above at goal.       Discharge home Saturday with A.

## 2019-04-26 NOTE — DISCHARGE NOTE PROVIDER - HOSPITAL COURSE
Pt is a 89 yo female PMH UC, HTN, HLD, hypothyroidism, GERD, hx of falls coming in for acute mental status change since Monday. History provided by pt's daughter. Pt is acutely confused since Monday, answering her door in her undergarment, bringing down her walker from the second floor (she normally has walkers in every floor), confusing her radio for her telephone. Patient also complaining of hip pain since Friday. There is concern patient may have had a fall. When questioned, pt pleasantly confused (states her gown is black), pt not aware of anything wrong with her but reports +abd pain, hip pain and burning and blood in her urine. Pt is a 89 yo female PMH UC, HTN, HLD, hypothyroidism, GERD, hx of falls coming in for acute mental status change since Monday. History provided by pt's daughter. Pt is acutely confused since Monday, answering her door in her undergarment, bringing down her walker from the second floor (she normally has walkers in every floor), confusing her radio for her telephone. Patient also complaining of hip pain since Friday. There is concern patient may have had a fall. When questioned, pt pleasantly confused (states her gown is black), pt not aware of anything wrong with her but reports +abd pain, hip pain and burning and blood in her urine.    CT head is negative, no infection or other abnormality was found, seen by Neurology. Pt is hemodynamically stable to be discharged home today, she has HHA. Attending is aware. Pt is a 89 yo female PMH stable UC, not on treatment, HTN, HLD, hypothyroidism, GERD, hx of falls coming in for acute mental status change since Monday. History provided by pt's daughter. Pt is acutely confused since Monday, answering her door in her undergarment, bringing down her walker from the second floor (she normally has walkers in every floor), confusing her radio for her telephone. Patient also complaining of hip pain since Friday. There is concern patient may have had a fall. When questioned, pt pleasantly confused (states her gown is black), pt not aware of anything wrong with her but reports +abd pain, hip pain and burning and blood in her urine.        CT head is negative for any acute infarcts. No infections were found. Labs all WNL. Was seen by Neurology and Psychiatry who recommended melatonin to regulate sleep. Pt is hemodynamically stable to be discharged home today, she has HHA. [facility] X-rays negative for pneumonia.  No infections were found.  No couse for altered mental status was found.

## 2019-04-26 NOTE — PROGRESS NOTE ADULT - SUBJECTIVE AND OBJECTIVE BOX
INTERVAL HPI/OVERNIGHT EVENTS:  Pt seen and examined at bedside.     Allergies/Intolerance: No Known Allergies      MEDICATIONS  (STANDING):  artificial  tears Solution 1 Drop(s) Both EYES every 4 hours  atorvastatin 20 milliGRAM(s) Oral at bedtime  balsalazide 2250 milliGRAM(s) Oral three times a day  DULoxetine 60 milliGRAM(s) Oral daily  folic acid 1 milliGRAM(s) Oral daily  heparin  Injectable 5000 Unit(s) SubCutaneous every 12 hours  lactobacillus acidophilus 1 Tablet(s) Oral two times a day  levothyroxine 50 MICROGram(s) Oral daily  melatonin 3 milliGRAM(s) Oral at bedtime  metoprolol succinate ER 25 milliGRAM(s) Oral daily  prednisoLONE acetate 1% Suspension 1 Drop(s) Both EYES two times a day  sertraline 50 milliGRAM(s) Oral daily  sodium chloride 5% Solution 1 Drop(s) Left EYE four times a day    MEDICATIONS  (PRN):  haloperidol    Concentrate 0.5 milliGRAM(s) Oral every 6 hours PRN Agitation        ROS: all systems reviewed and wnl      PHYSICAL EXAMINATION:  Vital Signs Last 24 Hrs  T(C): 36.8 (26 Apr 2019 04:46), Max: 36.9 (25 Apr 2019 12:22)  T(F): 98.2 (26 Apr 2019 04:46), Max: 98.4 (25 Apr 2019 12:22)  HR: 71 (26 Apr 2019 04:46) (70 - 77)  BP: 130/71 (26 Apr 2019 04:46) (120/70 - 132/67)  BP(mean): --  RR: 17 (26 Apr 2019 04:46) (17 - 18)  SpO2: 96% (26 Apr 2019 04:46) (96% - 97%)  CAPILLARY BLOOD GLUCOSE          04-25 @ 07:01  -  04-26 @ 07:00  --------------------------------------------------------  IN: 1400 mL / OUT: 350 mL / NET: 1050 mL        GENERAL:   NECK: supple, No JVD  CHEST/LUNG: clear to auscultation bilaterally; no rales, rhonchi, or wheezing b/l  HEART: normal S1, S2  ABDOMEN: BS+, soft, ND, NT   EXTREMITIES:  pulses palpable; no clubbing, cyanosis, or edema b/l LEs  SKIN: no rashes or lesions      LABS: INTERVAL HPI/OVERNIGHT EVENTS:  Pt seen and examined at bedside.     Allergies/Intolerance: No Known Allergies      MEDICATIONS  (STANDING):  artificial  tears Solution 1 Drop(s) Both EYES every 4 hours  atorvastatin 20 milliGRAM(s) Oral at bedtime  balsalazide 2250 milliGRAM(s) Oral three times a day  DULoxetine 60 milliGRAM(s) Oral daily  folic acid 1 milliGRAM(s) Oral daily  heparin  Injectable 5000 Unit(s) SubCutaneous every 12 hours  lactobacillus acidophilus 1 Tablet(s) Oral two times a day  levothyroxine 50 MICROGram(s) Oral daily  melatonin 3 milliGRAM(s) Oral at bedtime  metoprolol succinate ER 25 milliGRAM(s) Oral daily  prednisoLONE acetate 1% Suspension 1 Drop(s) Both EYES two times a day  sertraline 50 milliGRAM(s) Oral daily  sodium chloride 5% Solution 1 Drop(s) Left EYE four times a day    MEDICATIONS  (PRN):  haloperidol    Concentrate 0.5 milliGRAM(s) Oral every 6 hours PRN Agitation        ROS: all systems reviewed and wnl      PHYSICAL EXAMINATION:  Vital Signs Last 24 Hrs  T(C): 36.8 (26 Apr 2019 04:46), Max: 36.9 (25 Apr 2019 12:22)  T(F): 98.2 (26 Apr 2019 04:46), Max: 98.4 (25 Apr 2019 12:22)  HR: 71 (26 Apr 2019 04:46) (70 - 77)  BP: 130/71 (26 Apr 2019 04:46) (120/70 - 132/67)  BP(mean): --  RR: 17 (26 Apr 2019 04:46) (17 - 18)  SpO2: 96% (26 Apr 2019 04:46) (96% - 97%)  CAPILLARY BLOOD GLUCOSE          04-25 @ 07:01  -  04-26 @ 07:00  --------------------------------------------------------  IN: 1400 mL / OUT: 350 mL / NET: 1050 mL        GENERAL: stable in bed, comfortable, no fevers or SOB  NECK: supple, No JVD  CHEST/LUNG: clear to auscultation bilaterally; no rales, rhonchi, or wheezing b/l  HEART: normal S1, S2  ABDOMEN: BS+, soft, ND, NT   EXTREMITIES:  pulses palpable; no clubbing, cyanosis, or edema b/l LEs  SKIN: no rashes or lesions      LABS:

## 2019-04-27 RX ORDER — ACETAMINOPHEN 500 MG
650 TABLET ORAL ONCE
Qty: 0 | Refills: 0 | Status: COMPLETED | OUTPATIENT
Start: 2019-04-27 | End: 2019-04-27

## 2019-04-27 RX ADMIN — Medication 1 MILLIGRAM(S): at 11:54

## 2019-04-27 RX ADMIN — Medication 25 MILLIGRAM(S): at 05:49

## 2019-04-27 RX ADMIN — BALSALAZIDE DISODIUM 2250 MILLIGRAM(S): 750 CAPSULE ORAL at 15:04

## 2019-04-27 RX ADMIN — HEPARIN SODIUM 5000 UNIT(S): 5000 INJECTION INTRAVENOUS; SUBCUTANEOUS at 18:22

## 2019-04-27 RX ADMIN — Medication 650 MILLIGRAM(S): at 06:15

## 2019-04-27 RX ADMIN — Medication 3 MILLIGRAM(S): at 21:35

## 2019-04-27 RX ADMIN — HEPARIN SODIUM 5000 UNIT(S): 5000 INJECTION INTRAVENOUS; SUBCUTANEOUS at 05:51

## 2019-04-27 RX ADMIN — DULOXETINE HYDROCHLORIDE 60 MILLIGRAM(S): 30 CAPSULE, DELAYED RELEASE ORAL at 11:54

## 2019-04-27 RX ADMIN — Medication 1 DROP(S): at 05:50

## 2019-04-27 RX ADMIN — BALSALAZIDE DISODIUM 2250 MILLIGRAM(S): 750 CAPSULE ORAL at 05:51

## 2019-04-27 RX ADMIN — Medication 50 MICROGRAM(S): at 05:17

## 2019-04-27 RX ADMIN — BALSALAZIDE DISODIUM 2250 MILLIGRAM(S): 750 CAPSULE ORAL at 21:36

## 2019-04-27 RX ADMIN — SERTRALINE 50 MILLIGRAM(S): 25 TABLET, FILM COATED ORAL at 11:54

## 2019-04-27 RX ADMIN — ATORVASTATIN CALCIUM 20 MILLIGRAM(S): 80 TABLET, FILM COATED ORAL at 21:35

## 2019-04-27 RX ADMIN — Medication 1 DROP(S): at 21:37

## 2019-04-27 RX ADMIN — Medication 1 TABLET(S): at 05:49

## 2019-04-27 RX ADMIN — Medication 1 DROP(S): at 10:15

## 2019-04-27 RX ADMIN — Medication 1 DROP(S): at 10:10

## 2019-04-27 RX ADMIN — Medication 1 TABLET(S): at 18:22

## 2019-04-27 RX ADMIN — Medication 1 DROP(S): at 15:04

## 2019-04-27 RX ADMIN — Medication 650 MILLIGRAM(S): at 05:49

## 2019-04-27 RX ADMIN — Medication 1 DROP(S): at 15:02

## 2019-04-27 RX ADMIN — Medication 1 DROP(S): at 18:22

## 2019-04-27 NOTE — PROGRESS NOTE ADULT - ASSESSMENT
Pt is a 89 yo female PMH UC, HTN, HLD, hypothyroidism, GERD, hx of falls coming in for acute mental status change since Monday. History provided by pt's daughter. Pt is acutely confused since Monday, answering her door in her undergarment, bringing down her walker from the second floor (she normally has walkers in every floor), confusing her radio for her telephone. Patient also complaining of hip pain since Friday. There is concern patient may have had a fall. When questioned, pt pleasantly confused (states her gown is black), pt not aware of anything wrong with her but reports +abd pain, hip pain and burning and blood in her urine.    Neuro: Altered mental status, no clear cause. CT brain no acute findings. Discussed with Neurology, for repeat CT head today. UA, CXR all negative for infection. Labs all WNL. B-12 normal 943,  folate > 20 is normal. TSH normal .96. Doubt need for MRI of brain. PT eval requested. Discussed plan with Neurology today at bedside.      CV: Continue Toprol XL 25 mg/day and Lipitor 20 mg/day.      Psych: Continue Zoloft 50 mg/day and Cymbalta 60 mg/day. Psych consult for today.      Endo: Continue Synthyroid 50 mcg/day, TSH as above at goal.       Discharge home Saturday with A. Pt is a 89 yo female PMH UC, HTN, HLD, hypothyroidism, GERD, hx of falls coming in for acute mental status change since Monday. History provided by pt's daughter. Pt is acutely confused since Monday, answering her door in her undergarment, bringing down her walker from the second floor (she normally has walkers in every floor), confusing her radio for her telephone. Patient also complaining of hip pain since Friday. There is concern patient may have had a fall. When questioned, pt pleasantly confused (states her gown is black), pt not aware of anything wrong with her but reports +abd pain, hip pain and burning and blood in her urine.    Neuro: Altered mental status, no clear cause. CT brain repeat no acute findings. Discussed with Neurology. UA, CXR all negative for infection. Labs all WNL. B-12 normal 943,  folate > 20 is normal. TSH normal .96. No need for MRI of brain. PT eval requested. Discussed plan with Neurology today at bedside.      CV: Continue Toprol XL 25 mg/day and Lipitor 20 mg/day.      Psych: Continue Zoloft 50 mg/day and Cymbalta 60 mg/day. Psych consult for today.      Endo: Continue Synthyroid 50 mcg/day, TSH as above at goal.       Discharge home Sunday with A.

## 2019-04-27 NOTE — PROGRESS NOTE ADULT - SUBJECTIVE AND OBJECTIVE BOX
INTERVAL HPI/OVERNIGHT EVENTS:  Pt seen and examined at bedside.     Allergies/Intolerance: No Known Allergies      MEDICATIONS  (STANDING):  artificial  tears Solution 1 Drop(s) Both EYES every 4 hours  atorvastatin 20 milliGRAM(s) Oral at bedtime  balsalazide 2250 milliGRAM(s) Oral three times a day  DULoxetine 60 milliGRAM(s) Oral daily  folic acid 1 milliGRAM(s) Oral daily  heparin  Injectable 5000 Unit(s) SubCutaneous every 12 hours  lactobacillus acidophilus 1 Tablet(s) Oral two times a day  levothyroxine 50 MICROGram(s) Oral daily  melatonin 3 milliGRAM(s) Oral at bedtime  metoprolol succinate ER 25 milliGRAM(s) Oral daily  prednisoLONE acetate 1% Suspension 1 Drop(s) Both EYES two times a day  sertraline 50 milliGRAM(s) Oral daily  sodium chloride 5% Solution 1 Drop(s) Left EYE four times a day    MEDICATIONS  (PRN):  haloperidol    Concentrate 0.5 milliGRAM(s) Oral every 6 hours PRN Agitation        ROS: all systems reviewed and wnl      PHYSICAL EXAMINATION:  Vital Signs Last 24 Hrs  T(C): 37 (27 Apr 2019 03:16), Max: 37.3 (26 Apr 2019 20:21)  T(F): 98.6 (27 Apr 2019 03:16), Max: 99.1 (26 Apr 2019 20:21)  HR: 82 (27 Apr 2019 05:48) (75 - 97)  BP: 130/78 (27 Apr 2019 05:48) (116/85 - 138/81)  BP(mean): --  RR: 18 (27 Apr 2019 05:48) (17 - 18)  SpO2: 93% (27 Apr 2019 03:16) (93% - 96%)  CAPILLARY BLOOD GLUCOSE          04-25 @ 07:01  -  04-26 @ 07:00  --------------------------------------------------------  IN: 1400 mL / OUT: 350 mL / NET: 1050 mL    04-26 @ 07:01 - 04-27 @ 06:38  --------------------------------------------------------  IN: 600 mL / OUT: 0 mL / NET: 600 mL        GENERAL:   NECK: supple, No JVD  CHEST/LUNG: clear to auscultation bilaterally; no rales, rhonchi, or wheezing b/l  HEART: normal S1, S2  ABDOMEN: BS+, soft, ND, NT   EXTREMITIES:  pulses palpable; no clubbing, cyanosis, or edema b/l LEs  SKIN: no rashes or lesions      LABS: INTERVAL HPI/OVERNIGHT EVENTS:  Pt seen and examined at bedside.     Allergies/Intolerance: No Known Allergies      MEDICATIONS  (STANDING):  artificial  tears Solution 1 Drop(s) Both EYES every 4 hours  atorvastatin 20 milliGRAM(s) Oral at bedtime  balsalazide 2250 milliGRAM(s) Oral three times a day  DULoxetine 60 milliGRAM(s) Oral daily  folic acid 1 milliGRAM(s) Oral daily  heparin  Injectable 5000 Unit(s) SubCutaneous every 12 hours  lactobacillus acidophilus 1 Tablet(s) Oral two times a day  levothyroxine 50 MICROGram(s) Oral daily  melatonin 3 milliGRAM(s) Oral at bedtime  metoprolol succinate ER 25 milliGRAM(s) Oral daily  prednisoLONE acetate 1% Suspension 1 Drop(s) Both EYES two times a day  sertraline 50 milliGRAM(s) Oral daily  sodium chloride 5% Solution 1 Drop(s) Left EYE four times a day    MEDICATIONS  (PRN):  haloperidol    Concentrate 0.5 milliGRAM(s) Oral every 6 hours PRN Agitation        ROS: all systems reviewed and wnl      PHYSICAL EXAMINATION:  Vital Signs Last 24 Hrs  T(C): 37 (27 Apr 2019 03:16), Max: 37.3 (26 Apr 2019 20:21)  T(F): 98.6 (27 Apr 2019 03:16), Max: 99.1 (26 Apr 2019 20:21)  HR: 82 (27 Apr 2019 05:48) (75 - 97)  BP: 130/78 (27 Apr 2019 05:48) (116/85 - 138/81)  BP(mean): --  RR: 18 (27 Apr 2019 05:48) (17 - 18)  SpO2: 93% (27 Apr 2019 03:16) (93% - 96%)  CAPILLARY BLOOD GLUCOSE          04-25 @ 07:01  -  04-26 @ 07:00  --------------------------------------------------------  IN: 1400 mL / OUT: 350 mL / NET: 1050 mL    04-26 @ 07:01 - 04-27 @ 06:38  --------------------------------------------------------  IN: 600 mL / OUT: 0 mL / NET: 600 mL        GENERAL: comfortable in bed, NAD, no fevers or cough  NECK: supple, No JVD  CHEST/LUNG: clear to auscultation bilaterally; no rales, rhonchi, or wheezing b/l  HEART: normal S1, S2  ABDOMEN: BS+, soft, ND, NT   EXTREMITIES:  pulses palpable; no clubbing, cyanosis, or edema b/l LEs  SKIN: no rashes or lesions      LABS:

## 2019-04-28 VITALS
OXYGEN SATURATION: 94 % | SYSTOLIC BLOOD PRESSURE: 138 MMHG | HEART RATE: 92 BPM | TEMPERATURE: 98 F | RESPIRATION RATE: 18 BRPM | DIASTOLIC BLOOD PRESSURE: 69 MMHG

## 2019-04-28 DIAGNOSIS — K51.90 ULCERATIVE COLITIS, UNSPECIFIED, WITHOUT COMPLICATIONS: ICD-10-CM

## 2019-04-28 LAB
CULTURE RESULTS: SIGNIFICANT CHANGE UP
CULTURE RESULTS: SIGNIFICANT CHANGE UP
SPECIMEN SOURCE: SIGNIFICANT CHANGE UP
SPECIMEN SOURCE: SIGNIFICANT CHANGE UP

## 2019-04-28 PROCEDURE — 86780 TREPONEMA PALLIDUM: CPT

## 2019-04-28 PROCEDURE — 85014 HEMATOCRIT: CPT

## 2019-04-28 PROCEDURE — 84439 ASSAY OF FREE THYROXINE: CPT

## 2019-04-28 PROCEDURE — 87040 BLOOD CULTURE FOR BACTERIA: CPT

## 2019-04-28 PROCEDURE — 84132 ASSAY OF SERUM POTASSIUM: CPT

## 2019-04-28 PROCEDURE — 87633 RESP VIRUS 12-25 TARGETS: CPT

## 2019-04-28 PROCEDURE — 82607 VITAMIN B-12: CPT

## 2019-04-28 PROCEDURE — 84443 ASSAY THYROID STIM HORMONE: CPT

## 2019-04-28 PROCEDURE — 87486 CHLMYD PNEUM DNA AMP PROBE: CPT

## 2019-04-28 PROCEDURE — 82746 ASSAY OF FOLIC ACID SERUM: CPT

## 2019-04-28 PROCEDURE — 82330 ASSAY OF CALCIUM: CPT

## 2019-04-28 PROCEDURE — 97116 GAIT TRAINING THERAPY: CPT

## 2019-04-28 PROCEDURE — 80053 COMPREHEN METABOLIC PANEL: CPT

## 2019-04-28 PROCEDURE — 82947 ASSAY GLUCOSE BLOOD QUANT: CPT

## 2019-04-28 PROCEDURE — 82435 ASSAY OF BLOOD CHLORIDE: CPT

## 2019-04-28 PROCEDURE — 97110 THERAPEUTIC EXERCISES: CPT

## 2019-04-28 PROCEDURE — 71045 X-RAY EXAM CHEST 1 VIEW: CPT

## 2019-04-28 PROCEDURE — 84484 ASSAY OF TROPONIN QUANT: CPT

## 2019-04-28 PROCEDURE — 81001 URINALYSIS AUTO W/SCOPE: CPT

## 2019-04-28 PROCEDURE — 82803 BLOOD GASES ANY COMBINATION: CPT

## 2019-04-28 PROCEDURE — 51702 INSERT TEMP BLADDER CATH: CPT

## 2019-04-28 PROCEDURE — 93005 ELECTROCARDIOGRAM TRACING: CPT | Mod: 76,XU

## 2019-04-28 PROCEDURE — 84295 ASSAY OF SERUM SODIUM: CPT

## 2019-04-28 PROCEDURE — 85027 COMPLETE CBC AUTOMATED: CPT

## 2019-04-28 PROCEDURE — 73522 X-RAY EXAM HIPS BI 3-4 VIEWS: CPT

## 2019-04-28 PROCEDURE — 97162 PT EVAL MOD COMPLEX 30 MIN: CPT

## 2019-04-28 PROCEDURE — 82306 VITAMIN D 25 HYDROXY: CPT

## 2019-04-28 PROCEDURE — 87581 M.PNEUMON DNA AMP PROBE: CPT

## 2019-04-28 PROCEDURE — 87798 DETECT AGENT NOS DNA AMP: CPT

## 2019-04-28 PROCEDURE — 99285 EMERGENCY DEPT VISIT HI MDM: CPT | Mod: 25

## 2019-04-28 PROCEDURE — 83605 ASSAY OF LACTIC ACID: CPT

## 2019-04-28 PROCEDURE — 87086 URINE CULTURE/COLONY COUNT: CPT

## 2019-04-28 PROCEDURE — 70450 CT HEAD/BRAIN W/O DYE: CPT

## 2019-04-28 RX ORDER — ACETAMINOPHEN 500 MG
650 TABLET ORAL ONCE
Qty: 0 | Refills: 0 | Status: COMPLETED | OUTPATIENT
Start: 2019-04-28 | End: 2019-04-28

## 2019-04-28 RX ADMIN — BALSALAZIDE DISODIUM 2250 MILLIGRAM(S): 750 CAPSULE ORAL at 06:27

## 2019-04-28 RX ADMIN — Medication 1 DROP(S): at 06:27

## 2019-04-28 RX ADMIN — Medication 650 MILLIGRAM(S): at 04:03

## 2019-04-28 RX ADMIN — HEPARIN SODIUM 5000 UNIT(S): 5000 INJECTION INTRAVENOUS; SUBCUTANEOUS at 06:23

## 2019-04-28 RX ADMIN — Medication 1 DROP(S): at 13:19

## 2019-04-28 RX ADMIN — Medication 25 MILLIGRAM(S): at 06:23

## 2019-04-28 RX ADMIN — Medication 50 MICROGRAM(S): at 06:23

## 2019-04-28 RX ADMIN — BALSALAZIDE DISODIUM 2250 MILLIGRAM(S): 750 CAPSULE ORAL at 13:20

## 2019-04-28 RX ADMIN — Medication 1 DROP(S): at 13:20

## 2019-04-28 RX ADMIN — Medication 1 MILLIGRAM(S): at 13:19

## 2019-04-28 RX ADMIN — DULOXETINE HYDROCHLORIDE 60 MILLIGRAM(S): 30 CAPSULE, DELAYED RELEASE ORAL at 13:19

## 2019-04-28 RX ADMIN — Medication 1 TABLET(S): at 06:23

## 2019-04-28 RX ADMIN — Medication 1 DROP(S): at 10:19

## 2019-04-28 RX ADMIN — SERTRALINE 50 MILLIGRAM(S): 25 TABLET, FILM COATED ORAL at 13:18

## 2019-04-28 RX ADMIN — Medication 650 MILLIGRAM(S): at 04:32

## 2019-04-28 NOTE — PROGRESS NOTE ADULT - SUBJECTIVE AND OBJECTIVE BOX
INTERVAL HPI/OVERNIGHT EVENTS:  Pt seen and examined at bedside.     Allergies/Intolerance: No Known Allergies      MEDICATIONS  (STANDING):  artificial  tears Solution 1 Drop(s) Both EYES every 4 hours  atorvastatin 20 milliGRAM(s) Oral at bedtime  balsalazide 2250 milliGRAM(s) Oral three times a day  DULoxetine 60 milliGRAM(s) Oral daily  folic acid 1 milliGRAM(s) Oral daily  heparin  Injectable 5000 Unit(s) SubCutaneous every 12 hours  lactobacillus acidophilus 1 Tablet(s) Oral two times a day  levothyroxine 50 MICROGram(s) Oral daily  melatonin 3 milliGRAM(s) Oral at bedtime  metoprolol succinate ER 25 milliGRAM(s) Oral daily  prednisoLONE acetate 1% Suspension 1 Drop(s) Both EYES two times a day  sertraline 50 milliGRAM(s) Oral daily  sodium chloride 5% Solution 1 Drop(s) Left EYE four times a day    MEDICATIONS  (PRN):  haloperidol    Concentrate 0.5 milliGRAM(s) Oral every 6 hours PRN Agitation        ROS: all systems reviewed and wnl      PHYSICAL EXAMINATION:  Vital Signs Last 24 Hrs  T(C): 36.8 (28 Apr 2019 03:25), Max: 36.9 (27 Apr 2019 20:00)  T(F): 98.2 (28 Apr 2019 03:25), Max: 98.5 (27 Apr 2019 20:00)  HR: 92 (28 Apr 2019 06:18) (90 - 98)  BP: 151/85 (28 Apr 2019 06:18) (135/79 - 153/67)  BP(mean): --  RR: 18 (28 Apr 2019 06:18) (18 - 18)  SpO2: 94% (28 Apr 2019 03:25) (94% - 96%)  CAPILLARY BLOOD GLUCOSE          04-27 @ 07:01 - 04-28 @ 07:00  --------------------------------------------------------  IN: 1060 mL / OUT: 0 mL / NET: 1060 mL    04-28 @ 07:01 - 04-28 @ 09:51  --------------------------------------------------------  IN: 360 mL / OUT: 0 mL / NET: 360 mL        GENERAL:   NECK: supple, No JVD  CHEST/LUNG: clear to auscultation bilaterally; no rales, rhonchi, or wheezing b/l  HEART: normal S1, S2  ABDOMEN: BS+, soft, ND, NT   EXTREMITIES:  pulses palpable; no clubbing, cyanosis, or edema b/l LEs  SKIN: no rashes or lesions      LABS: INTERVAL HPI/OVERNIGHT EVENTS:  Pt seen and examined at bedside.     Allergies/Intolerance: No Known Allergies      MEDICATIONS  (STANDING):  artificial  tears Solution 1 Drop(s) Both EYES every 4 hours  atorvastatin 20 milliGRAM(s) Oral at bedtime  balsalazide 2250 milliGRAM(s) Oral three times a day  DULoxetine 60 milliGRAM(s) Oral daily  folic acid 1 milliGRAM(s) Oral daily  heparin  Injectable 5000 Unit(s) SubCutaneous every 12 hours  lactobacillus acidophilus 1 Tablet(s) Oral two times a day  levothyroxine 50 MICROGram(s) Oral daily  melatonin 3 milliGRAM(s) Oral at bedtime  metoprolol succinate ER 25 milliGRAM(s) Oral daily  prednisoLONE acetate 1% Suspension 1 Drop(s) Both EYES two times a day  sertraline 50 milliGRAM(s) Oral daily  sodium chloride 5% Solution 1 Drop(s) Left EYE four times a day    MEDICATIONS  (PRN):  haloperidol    Concentrate 0.5 milliGRAM(s) Oral every 6 hours PRN Agitation        ROS: all systems reviewed and wnl      PHYSICAL EXAMINATION:  Vital Signs Last 24 Hrs  T(C): 36.8 (28 Apr 2019 03:25), Max: 36.9 (27 Apr 2019 20:00)  T(F): 98.2 (28 Apr 2019 03:25), Max: 98.5 (27 Apr 2019 20:00)  HR: 92 (28 Apr 2019 06:18) (90 - 98)  BP: 151/85 (28 Apr 2019 06:18) (135/79 - 153/67)  BP(mean): --  RR: 18 (28 Apr 2019 06:18) (18 - 18)  SpO2: 94% (28 Apr 2019 03:25) (94% - 96%)  CAPILLARY BLOOD GLUCOSE          04-27 @ 07:01 - 04-28 @ 07:00  --------------------------------------------------------  IN: 1060 mL / OUT: 0 mL / NET: 1060 mL    04-28 @ 07:01 - 04-28 @ 09:51  --------------------------------------------------------  IN: 360 mL / OUT: 0 mL / NET: 360 mL        GENERAL: stable, no new complaints  NECK: supple, No JVD  CHEST/LUNG: clear to auscultation bilaterally; no rales, rhonchi, or wheezing b/l  HEART: normal S1, S2  ABDOMEN: BS+, soft, ND, NT   EXTREMITIES:  pulses palpable; no clubbing, cyanosis, or edema b/l LEs  SKIN: no rashes or lesions      LABS:

## 2019-05-29 ENCOUNTER — APPOINTMENT (OUTPATIENT)
Dept: ORTHOPEDIC SURGERY | Facility: CLINIC | Age: 84
End: 2019-05-29
Payer: MEDICARE

## 2019-05-29 VITALS
HEART RATE: 86 BPM | BODY MASS INDEX: 29.25 KG/M2 | SYSTOLIC BLOOD PRESSURE: 131 MMHG | DIASTOLIC BLOOD PRESSURE: 82 MMHG | WEIGHT: 130 LBS | HEIGHT: 56 IN

## 2019-05-29 DIAGNOSIS — Z86.79 PERSONAL HISTORY OF OTHER DISEASES OF THE CIRCULATORY SYSTEM: ICD-10-CM

## 2019-05-29 DIAGNOSIS — Z87.39 PERSONAL HISTORY OF OTHER DISEASES OF THE MUSCULOSKELETAL SYSTEM AND CONNECTIVE TISSUE: ICD-10-CM

## 2019-05-29 DIAGNOSIS — Z86.39 PERSONAL HISTORY OF OTHER ENDOCRINE, NUTRITIONAL AND METABOLIC DISEASE: ICD-10-CM

## 2019-05-29 DIAGNOSIS — Z82.61 FAMILY HISTORY OF ARTHRITIS: ICD-10-CM

## 2019-05-29 DIAGNOSIS — Z87.19 PERSONAL HISTORY OF OTHER DISEASES OF THE DIGESTIVE SYSTEM: ICD-10-CM

## 2019-05-29 DIAGNOSIS — M54.16 RADICULOPATHY, LUMBAR REGION: ICD-10-CM

## 2019-05-29 PROCEDURE — 99203 OFFICE O/P NEW LOW 30 MIN: CPT

## 2019-05-29 PROCEDURE — 72110 X-RAY EXAM L-2 SPINE 4/>VWS: CPT

## 2019-05-29 RX ORDER — PRENATAL VIT 49/IRON FUM/FOLIC 6.75-0.2MG
TABLET ORAL
Refills: 0 | Status: ACTIVE | COMMUNITY

## 2019-05-29 RX ORDER — LEVOTHYROXINE SODIUM 137 UG/1
TABLET ORAL
Refills: 0 | Status: ACTIVE | COMMUNITY

## 2019-05-29 RX ORDER — SODIUM CHLORIDE 50 MG/ML
SOLUTION OPHTHALMIC
Refills: 0 | Status: ACTIVE | COMMUNITY

## 2019-05-29 RX ORDER — CHOLECALCIFEROL (VITAMIN D3) 25 MCG
TABLET ORAL
Refills: 0 | Status: ACTIVE | COMMUNITY

## 2019-05-29 RX ORDER — BIFIDOBACTERIUM LONGUM 10MM CELL
CAPSULE ORAL
Refills: 0 | Status: ACTIVE | COMMUNITY

## 2019-05-29 NOTE — DISCUSSION/SUMMARY
[Medication Risks Reviewed] : Medication risks reviewed [de-identified] : Benefits risks and options were reviewed with the patient with daughter present. One proceed with physical therapy. She'll follow up with the therapy doesn't work after 4-6 weeks we'll consider an MRI and epidural injections at that time if needed Unknown if ever smoked

## 2019-05-29 NOTE — HISTORY OF PRESENT ILLNESS
[de-identified] : Patient is a pleasant 89-year-old female with a one and a half month history of buttock pain that reason to the lateral right knee. She reports no previous history of issues concerning the knee or the lower back. She feels slightly better today. She denies any buckling or giving way of the leg. She denies any left lower extremity radiculopathy. She has been using Tylenol with some relief. She presents for consultation accompanied by her daughter. Patient has been ambulating with a walker for the past 3 years. The pain is localized and intermittent. Rest makes it better [Pain Location] : pain [9] : a current pain level of 9/10 [Standing] : worsened by standing [Walking] : worsened by walking [Rest] : relieved by rest

## 2019-05-29 NOTE — PHYSICAL EXAM
[Antalgic] : antalgic [Wide-Based] : wide-based [Stooped] : stooped [Walker] : ambulates with walker [Shuffling] : shuffling [Motor Strength Lower Extremities] : bilaterally weak [] : Sensory: [L3-RT] : L3 [L4-RT] : L4 [Obese] : obese [Normal] : Oriented to person, place, and time, insight and judgement were intact and the affect was normal [de-identified] : AP lateral and flexion-extension x-rays obtained the lumbar spine show L4-5 spondylolisthesis and scoliosis. There is multilevel degenerative disc disease.

## 2019-07-14 PROBLEM — A04.72 CLOSTRIDIUM DIFFICILE DIARRHEA: Status: ACTIVE | Noted: 2019-01-24

## 2019-07-24 ENCOUNTER — APPOINTMENT (OUTPATIENT)
Dept: GASTROENTEROLOGY | Facility: CLINIC | Age: 84
End: 2019-07-24
Payer: MEDICARE

## 2019-07-24 VITALS
HEART RATE: 73 BPM | RESPIRATION RATE: 17 BRPM | HEIGHT: 56 IN | TEMPERATURE: 98.1 F | DIASTOLIC BLOOD PRESSURE: 58 MMHG | BODY MASS INDEX: 29.25 KG/M2 | SYSTOLIC BLOOD PRESSURE: 110 MMHG | WEIGHT: 130 LBS | OXYGEN SATURATION: 93 %

## 2019-07-24 DIAGNOSIS — R10.13 EPIGASTRIC PAIN: ICD-10-CM

## 2019-07-24 PROCEDURE — 99214 OFFICE O/P EST MOD 30 MIN: CPT

## 2019-07-24 NOTE — ASSESSMENT
[FreeTextEntry1] : Impression:\par \par #1 increased frequency of bowel movements- several month history of increased frequency of postprandial bowel movements (soft formed stools, denies diarrhea or rectal bleeding). 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 Proximal extent of disease unclear. The patient had been stable and clinically in remission on balsalazide 3 tabs t.i.d.  \par \par #3 Clostridium difficile diarrhea 2/2018 and 1/2019-resolved after treatment with vancomycin.\par \par #4 hypothyroid.\par \par #5 hypercholesterolemia.\par \par #6 arthritis-chronic back pain.\par \par #7 dyspepsia- intermittent epigastric discomfort.\par \par #8 history of anemia-normocytic anemia. Likely multifactorial. Component of iron deficiency suspected related to previous active ulcerative colitis.\par \par #9 status post fall 12/2015.\par \par #10 umbilical hernia.

## 2019-07-24 NOTE — REASON FOR VISIT
[Follow-Up: _____] : a [unfilled] follow-up visit [Family Member] : family member [FreeTextEntry1] : regarding increased frequency of postprandial bowel movements.

## 2019-07-24 NOTE — HISTORY OF PRESENT ILLNESS
[FreeTextEntry1] : Office revisit on 7/24/18.\par \par Patient presents with complaint of increased frequency of bowel movements. Daughter in in attendance.\par \par INITIAL HISTORY:\par \par Onset of diarrhea 11/2014. Of note, since the onset of the diarrhea her symptoms have significantly improved.\par \par Salient history:\par     -Onset of diarrhea 11/2014. Previous normal bowel habit described as 2-3 bowel movements daily with a history of chronic mild constipation that prompted the patient to take stool softeners.\par     -Experienced the diarrhea soon after a lunch at the [employer]. No sick contacts from that event. Also receives food from [employer]. A sister and a family friend also had diarrhea at about the same time from that food exposure but their symptoms proved self-limited.\par     -Initially experienced severe diarrhea with urgency in association with numerous frequent nonbloody diarrheal stools. Describes very frequent stools but not able to describe how many. Observed mucus. No blood or oil.\par     -Evaluated by her internist. Empiric therapy with Cipro and Flagyl was prescribed for 10 days. Denies any antibiotics, travel or other relevant exposures before onset of symptoms. Was hospitalized for several days.\par     -Evaluated by gastroenterology: stool studies for qualitative fecal fat was negative. Stool for parasites, culture and Clostridium difficile were negative. Stool for occult blood was negative. CMP including liver enzymes was normal. Normocytic anemia with hemoglobin of 10.5, hematocrit 31.7 and MCV of 94.6 was noted.  CT scan 12/12/14-moderate mural thickening of sigmoid colon and portion of rectum. Diverticulosis noted. Possibly from colitis versus diverticular disease.\par     -Denies fever, dysphagia, nausea, vomiting. Some abdominal discomfort. Occasional heartburn.\par \par Significantly improved over the past 2 weeks. Currently experiencing 3-4 day formed yellow stools without any blood, mucus or oil. Experiencing some urge to move her bowels with a pressure sensation in the rectum and pelvis. Experiencing mild lower abdominal discomfort. Avoiding lactose products which she thinks might be helping. For some reason she is taking flaxseed which she feels helps.\par \par Denies past history of digestive illness. Reports having had a normal colonoscopy 7 years ago. Has an umbilical hernia.\par \par Following initial consultation-improved on a lactose-free diet.\par \par Reported 7/2015 having hard stools and screening. Saw scant blood. Evaluated by an internist and hemorrhoid cream with Proctosol was prescribed. Scant hematochezia with suspected to have been from hemorrhoids.\par \par INTERVAL HISTORY:\par \par -Flexible sigmoidoscopy performed on 8/25/15. Examination to 30 cm. Moderately severe proctosigmoiditis detected. Biopsy noted for chronic active colitis. No dysplasia or viral cytopathic effect detected.\par \par -Following sigmoidoscopy therapy initiated with balsalazide 3 tabs t.i.d. and Canasa suppository 1 g daily for the ulcerative colitis. Pantoprazole 40 mg daily started for complaint of epigastric pain and dyspepsia. Of note, at time of previous assessment patient did not wish to proceed with full colonoscopy or to have an upper endoscopy.\par \par -Review of the 9/17/2015 labs:\par     -CBC: WBC 8400, hemoglobin 11, hematocrit 34.2, MCV 95.8.\par     -Hepatic function profile: Normal. ALT 16.\par     -ESR 38.\par     -Creatinine 0.6.\par      -ferritin 34.6.\par     -Iron 37.\par     -B12 1000.\par     -Folate 24.3. \par \par CURRENT HISTORY:\par \par ORV 3/7/17:    -Doing well from a GI standpoint. Excellent appetite. Patient reports gradual weight increase. Denies any complaints of dysphagia, heartburn, nausea, vomiting or abdominal pain. Patient has 3-4 mostly formed bowel movements daily. Occasional loose stool but does not report diarrhea. Never observed blood.\par                         -Remains on balsalazide 4 tabs p.o. b.i.d.\par                         -Previously discontinued pantoprazole for prior dyspepsia symptoms. However, several months ago patient resumed pantoprazole 40 mg. Doing well. Reports no complaints of heartburn, nausea, vomiting, abdominal pain or early satiety.\par \par ORV 11.7.17:    -Recurrent symptoms of lower abdominal cramps and occasional diarrhea of 3 weeks' duration. Reports increased frequency of bowel movements and now has approximately 4 bowel movements daily which can be small formed stools or loose stools. Some days can have up to 6 bowel movements. The patient can experience mild lower abdominal cramps in association with moving her bowels. Has also experienced a recurrence of the intermittent epigastric discomfort which she previously had.\par                  -Denies fever. Excellent appetite. Weight stable. Reports no complaints of dysphagia, heartburn, nausea or vomiting.\par                  -Reports no travel, diet change or any recent increasing antibiotics. Was started on a new thyroid hormone replacement therapy 6 months ago. Other medications unchanged.\par                  -Remains on pantoprazole 40 mg and balsalazide 4 tabs b.i.d.\par \par ORV 2/9/18:    -Patient presents with complaint of increased diarrhea of approximately one-week duration. Daughter in attendance.\par                          -Following previous evaluation on 11/7/17 patient had significant improvement on regimen of balsalazide 3 tabs t.i.d. and Canasa suppository 1 g q.d. Patient discontinued Canasa suppository sometime in 1/2018. She was having approximately 4 formed bowel movements daily.\par                          -Recurrence of diarrhea reported of one-week duration but mostly of increased severity over the past 2 days. Of note, patient was treated with a 10 day course of amoxicillin for cough which she completed 4 days ago. Currently experiencing diarrhea with passage of frequent loose unformed watery diarrheal movements. Observed mucus but no blood. Denies fever, nausea or vomiting. Experiencing some lower abdominal cramps.\par \par ORV 11/27/18:     -Patient presents with symptoms of increased frequency of bowel movements of one week duration. Prior to one week ago the patient had been doing quite well. The patient was having 3-4 formed bowel movements daily without any complaints of diarrhea, urgency, rectal bleeding or abdominal pain. Overall was asymptomatic from a GI standpoint.\par              -Previously evaluated 2/2018 with antibiotic associated diarrhea attributed to Clostridium difficile. Responded very well to vancomycin.\par               -Past week reports increased frequency of bowel movements. Has up to 8-10 small formed stools with urgency. Stools are formed and not diarrhea. Observed mucus but no blood. Experiences urge to move her bowels postprandial. Denies fever, nausea or vomiting. No abdominal pain except for epigastric sensitivity which has been a long-standing symptom. Reports being under increased stress over the past several weeks. Sustained a fall 2 weeks ago prompting emergency room evaluation but patient incurred no injuries. Denies recent antibiotics or any medication change.\par                  -Patient remains on balsalazide 3 tabs p.o. t.i.d.\par \par ORV 7/24/19:     -Presents for GI followup regarding increased frequency of bowel movements. Reports current symptoms of approximately 2 to 3 months duration. Main complaint is urged to move bowels immediately after eating. Not describing diarrhea. Mostly soft formed bowel movements. Typically 3 or 4 soft formed bowel movements daily. However, immediate need to have bowel movement for his creating anxiety as patient feels it precludes going out, socializing etc. Denies rectal bleeding. Some urgency. Denies fever, nausea, vomiting or abdominal pain. No nocturnal occurrence.\par              -History of ulcerative colitis. Remains on maintenance balsalazide 3 tabs p.o. t.i.d.\par               -Previous history of dyspepsia. Previously treated with pantoprazole 40 mg. However, not currently experiencing dyspepsia symptoms and patient stopped pantoprazole.\par               -Clinical course noted for episode of Clostridium difficile disease 1/2019 after receiving amoxicillin for a dental infection. Patient previously had Clostridium difficile disease in the past. Clostridium difficile PCR was positive. Treated with vancomycin 125 mg q.i.d. ultimately for one month. Patient ultimately recovered and at discussion with daughter on 2/26/19 diarrhea was resolved and the patient was even somewhat constipated.

## 2019-07-24 NOTE — CONSULT LETTER
[Dear  ___] : Dear  [unfilled], [Please see my note below.] : Please see my note below. [Consult Closing:] : Thank you very much for allowing me to participate in the care of this patient.  If you have any questions, please do not hesitate to contact me. [Sincerely,] : Sincerely, [Jaspreet Lujan M.D.] : Jaspreet Lujan M.D. [Division of Gastroenterology] : Division of Gastroenterology [[address] : [address] [[facility]] : [facility] [[place] [address]] : [place] [address] [FreeTextEntry2] : Dr. Sean Schmitz [FreeTextEntry3] : Jaspreet Lujan M.D.

## 2019-07-26 LAB — GI PCR PANEL, STOOL: NORMAL

## 2019-07-27 LAB
C DIFF TOX GENS STL QL NAA+PROBE: NORMAL
CDIFF BY PCR: DETECTED

## 2019-07-27 RX ORDER — VANCOMYCIN HYDROCHLORIDE 125 MG/1
125 CAPSULE ORAL 4 TIMES DAILY
Qty: 56 | Refills: 2 | Status: ACTIVE | COMMUNITY
Start: 2019-07-27 | End: 1900-01-01

## 2019-07-29 LAB — BACTERIA STL CULT: NORMAL

## 2019-07-30 LAB — CALPROTECTIN FECAL: 155 UG/G

## 2020-08-26 ENCOUNTER — APPOINTMENT (OUTPATIENT)
Dept: GASTROENTEROLOGY | Facility: CLINIC | Age: 85
End: 2020-08-26
Payer: MEDICARE

## 2020-08-26 VITALS
HEIGHT: 56 IN | BODY MASS INDEX: 28.79 KG/M2 | HEART RATE: 87 BPM | SYSTOLIC BLOOD PRESSURE: 134 MMHG | WEIGHT: 128 LBS | OXYGEN SATURATION: 96 % | DIASTOLIC BLOOD PRESSURE: 60 MMHG | RESPIRATION RATE: 16 BRPM | TEMPERATURE: 97.5 F

## 2020-08-26 PROCEDURE — 99214 OFFICE O/P EST MOD 30 MIN: CPT

## 2020-08-26 NOTE — ASSESSMENT
[FreeTextEntry1] : Impression:\par \par #1 ulcerative colitis-proctosigmoiditis to at least 30 cm detected at the time of the 8/25/15 sigmoidoscopy. Proximal extent of disease unclear. The patient had been stable and clinically in remission on balsalazide 3 tabs t.i.d.  \par \par #2 Clostridium difficile diarrhea-  episodes 2/2018 1/2019 and 7/2019-resolved after treatment with vancomycin.\par \par #3 history of dyspepsia-resolved at the current time. No recurrence after discontinuation of pantoprazole.\par \par General medical problems:\par \par - hypothyroid.\par \par - hypercholesterolemia.\par \par - arthritis-chronic back pain.\par \par - history of anemia-normocytic anemia. Likely multifactorial. Component of iron deficiency suspected related to previous active ulcerative colitis.\par \par - status post fall 12/2015.\par \par - umbilical hernia.

## 2020-08-26 NOTE — CONSULT LETTER
[Dear  ___] : Dear  [unfilled], [Please see my note below.] : Please see my note below. [Consult Closing:] : Thank you very much for allowing me to participate in the care of this patient.  If you have any questions, please do not hesitate to contact me. [Sincerely,] : Sincerely, [Consult Letter:] : I had the pleasure of evaluating your patient, [unfilled]. [FreeTextEntry2] : Dr. Sean Schmitz [FreeTextEntry3] : Jaspreet Lujan M.D.

## 2020-08-26 NOTE — HISTORY OF PRESENT ILLNESS
[FreeTextEntry1] : Office revisit on 8/26/2020.\par \par Patient presents with complaint of increased frequency of bowel movements. Daughter in in attendance.\par \par INITIAL HISTORY:\par \par Onset of diarrhea 11/2014. Of note, since the onset of the diarrhea her symptoms have significantly improved.\par \par Salient history:\par     -Onset of diarrhea 11/2014. Previous normal bowel habit described as 2-3 bowel movements daily with a history of chronic mild constipation that prompted the patient to take stool softeners.\par     -Experienced the diarrhea soon after a lunch at the [employer]. No sick contacts from that event. Also receives food from [employer]. A sister and a family friend also had diarrhea at about the same time from that food exposure but their symptoms proved self-limited.\par     -Initially experienced severe diarrhea with urgency in association with numerous frequent nonbloody diarrheal stools. Describes very frequent stools but not able to describe how many. Observed mucus. No blood or oil.\par     -Evaluated by her internist. Empiric therapy with Cipro and Flagyl was prescribed for 10 days. Denies any antibiotics, travel or other relevant exposures before onset of symptoms. Was hospitalized for several days.\par     -Evaluated by gastroenterology: stool studies for qualitative fecal fat was negative. Stool for parasites, culture and Clostridium difficile were negative. Stool for occult blood was negative. CMP including liver enzymes was normal. Normocytic anemia with hemoglobin of 10.5, hematocrit 31.7 and MCV of 94.6 was noted.  CT scan 12/12/14-moderate mural thickening of sigmoid colon and portion of rectum. Diverticulosis noted. Possibly from colitis versus diverticular disease.\par     -Denies fever, dysphagia, nausea, vomiting. Some abdominal discomfort. Occasional heartburn.\par \par Significantly improved over the past 2 weeks. Currently experiencing 3-4 day formed yellow stools without any blood, mucus or oil. Experiencing some urge to move her bowels with a pressure sensation in the rectum and pelvis. Experiencing mild lower abdominal discomfort. Avoiding lactose products which she thinks might be helping. For some reason she is taking flaxseed which she feels helps.\par \par Denies past history of digestive illness. Reports having had a normal colonoscopy 7 years ago. Has an umbilical hernia.\par \par Following initial consultation-improved on a lactose-free diet.\par \par Reported 7/2015 having hard stools and screening. Saw scant blood. Evaluated by an internist and hemorrhoid cream with Proctosol was prescribed. Scant hematochezia with suspected to have been from hemorrhoids.\par \par INTERVAL HISTORY:\par \par -Flexible sigmoidoscopy performed on 8/25/15. Examination to 30 cm. Moderately severe proctosigmoiditis detected. Biopsy noted for chronic active colitis. No dysplasia or viral cytopathic effect detected.\par \par -Following sigmoidoscopy therapy initiated with balsalazide 3 tabs t.i.d. and Canasa suppository 1 g daily for the ulcerative colitis. Pantoprazole 40 mg daily started for complaint of epigastric pain and dyspepsia. Of note, at time of previous assessment patient did not wish to proceed with full colonoscopy or to have an upper endoscopy.\par \par -Review of the 9/17/2015 labs:\par     -CBC: WBC 8400, hemoglobin 11, hematocrit 34.2, MCV 95.8.\par     -Hepatic function profile: Normal. ALT 16.\par     -ESR 38.\par     -Creatinine 0.6.\par      -ferritin 34.6.\par     -Iron 37.\par     -B12 1000.\par     -Folate 24.3. \par \par CURRENT HISTORY:\par \par ORV 3/7/17:    -Doing well from a GI standpoint. Excellent appetite. Patient reports gradual weight increase. Denies any complaints of dysphagia, heartburn, nausea, vomiting or abdominal pain. Patient has 3-4 mostly formed bowel movements daily. Occasional loose stool but does not report diarrhea. Never observed blood.\par                         -Remains on balsalazide 4 tabs p.o. b.i.d.\par                         -Previously discontinued pantoprazole for prior dyspepsia symptoms. However, several months ago patient resumed pantoprazole 40 mg. Doing well. Reports no complaints of heartburn, nausea, vomiting, abdominal pain or early satiety.\par \par ORV 11.7.17:    -Recurrent symptoms of lower abdominal cramps and occasional diarrhea of 3 weeks' duration. Reports increased frequency of bowel movements and now has approximately 4 bowel movements daily which can be small formed stools or loose stools. Some days can have up to 6 bowel movements. The patient can experience mild lower abdominal cramps in association with moving her bowels. Has also experienced a recurrence of the intermittent epigastric discomfort which she previously had.\par                  -Denies fever. Excellent appetite. Weight stable. Reports no complaints of dysphagia, heartburn, nausea or vomiting.\par                  -Reports no travel, diet change or any recent increasing antibiotics. Was started on a new thyroid hormone replacement therapy 6 months ago. Other medications unchanged.\par                  -Remains on pantoprazole 40 mg and balsalazide 4 tabs b.i.d.\par \par ORV 2/9/18:    -Patient presents with complaint of increased diarrhea of approximately one-week duration. Daughter in attendance.\par                          -Following previous evaluation on 11/7/17 patient had significant improvement on regimen of balsalazide 3 tabs t.i.d. and Canasa suppository 1 g q.d. Patient discontinued Canasa suppository sometime in 1/2018. She was having approximately 4 formed bowel movements daily.\par                          -Recurrence of diarrhea reported of one-week duration but mostly of increased severity over the past 2 days. Of note, patient was treated with a 10 day course of amoxicillin for cough which she completed 4 days ago. Currently experiencing diarrhea with passage of frequent loose unformed watery diarrheal movements. Observed mucus but no blood. Denies fever, nausea or vomiting. Experiencing some lower abdominal cramps.\par \par ORV 11/27/18:     -Patient presents with symptoms of increased frequency of bowel movements of one week duration. Prior to one week ago the patient had been doing quite well. The patient was having 3-4 formed bowel movements daily without any complaints of diarrhea, urgency, rectal bleeding or abdominal pain. Overall was asymptomatic from a GI standpoint.\par              -Previously evaluated 2/2018 with antibiotic associated diarrhea attributed to Clostridium difficile. Responded very well to vancomycin.\par               -Past week reports increased frequency of bowel movements. Has up to 8-10 small formed stools with urgency. Stools are formed and not diarrhea. Observed mucus but no blood. Experiences urge to move her bowels postprandial. Denies fever, nausea or vomiting. No abdominal pain except for epigastric sensitivity which has been a long-standing symptom. Reports being under increased stress over the past several weeks. Sustained a fall 2 weeks ago prompting emergency room evaluation but patient incurred no injuries. Denies recent antibiotics or any medication change.\par                  -Patient remains on balsalazide 3 tabs p.o. t.i.d.\par \par ORV 7/24/19:     -Presents for GI followup regarding increased frequency of bowel movements. Reports current symptoms of approximately 2 to 3 months duration. Main complaint is urged to move bowels immediately after eating. Not describing diarrhea. Mostly soft formed bowel movements. Typically 3 or 4 soft formed bowel movements daily. However, immediate need to have bowel movement for his creating anxiety as patient feels it precludes going out, socializing etc. Denies rectal bleeding. Some urgency. Denies fever, nausea, vomiting or abdominal pain. No nocturnal occurrence.\par              -History of ulcerative colitis. Remains on maintenance balsalazide 3 tabs p.o. t.i.d.\par               -Previous history of dyspepsia. Previously treated with pantoprazole 40 mg. However, not currently experiencing dyspepsia symptoms and patient stopped pantoprazole.\par               -Clinical course noted for episode of Clostridium difficile disease 1/2019 after receiving amoxicillin for a dental infection. Patient previously had Clostridium difficile disease in the past. Clostridium difficile PCR was positive. Treated with vancomycin 125 mg q.i.d. ultimately for one month. Patient ultimately recovered and at discussion with daughter on 2/26/19 diarrhea was resolved and the patient was even somewhat constipated.\par \par ORV 8/26/2020:     -Patient presents for followup regarding history of ulcerative colitis. Daughter in attendance. Currently doing well on current regimen of balsalazide 3 tabs p.o. t.i.d. Mostly having 3 bowel movements daily of variable consistency. However, no significant diarrhea reported and denies any hematochezia. Appetite stable and reports several pound weight increase. Denies complaints of dysphagia, nausea, vomiting or abdominal pain. Previously had been on pantoprazole for dyspepsia symptoms but has since been discontinued without recurrent dyspepsia complaints. Had been on Align but discontinued with no change in clinical status. 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 Favorable response to treatment and no recurrence.

## 2020-08-26 NOTE — PHYSICAL EXAM
[General Appearance - Alert] : alert [General Appearance - In No Acute Distress] : in no acute distress [General Appearance - Well-Appearing] : healthy appearing [General Appearance - Well Nourished] : well nourished [General Appearance - Well Developed] : well developed [Sclera] : the sclera and conjunctiva were normal [Neck Cervical Mass (___cm)] : no neck mass was observed [Neck Appearance] : the appearance of the neck was normal [Respiration, Rhythm And Depth] : normal respiratory rhythm and effort [Exaggerated Use Of Accessory Muscles For Inspiration] : no accessory muscle use [Auscultation Breath Sounds / Voice Sounds] : lungs were clear to auscultation bilaterally [Heart Rate And Rhythm] : heart rate was normal and rhythm regular [Heart Sounds] : normal S1 and S2 [Heart Sounds Gallop] : no gallops [Murmurs] : no murmurs [Heart Sounds Pericardial Friction Rub] : no pericardial rub [Edema] : there was no peripheral edema [Abdomen Soft] : soft [Abdomen Tenderness] : non-tender [] : no hepato-splenomegaly [Abdomen Mass (___ Cm)] : no abdominal mass palpated [Cervical Lymph Nodes Enlarged Posterior Bilaterally] : posterior cervical [Cervical Lymph Nodes Enlarged Anterior Bilaterally] : anterior cervical [Supraclavicular Lymph Nodes Enlarged Bilaterally] : supraclavicular [Nail Clubbing] : no clubbing  or cyanosis of the fingernails [Skin Color & Pigmentation] : normal skin color and pigmentation [Oriented To Time, Place, And Person] : oriented to person, place, and time [Impaired Insight] : insight and judgment were intact [Affect] : the affect was normal [Mood] : the mood was normal [FreeTextEntry1] : Small umbilical hernia. Diastasis recti is noted. The abdomen is obese, soft, nontender, nondistended and without mass or hepatosplenomegaly.

## 2020-08-26 NOTE — REASON FOR VISIT
[Family Member] : family member [Follow-Up: _____] : a [unfilled] follow-up visit [FreeTextEntry1] : of ulcerative colitis.

## 2020-09-09 ENCOUNTER — APPOINTMENT (OUTPATIENT)
Dept: ORTHOPEDIC SURGERY | Facility: CLINIC | Age: 85
End: 2020-09-09
Payer: MEDICARE

## 2020-09-09 VITALS — WEIGHT: 128 LBS | BODY MASS INDEX: 28.79 KG/M2 | HEIGHT: 56 IN | TEMPERATURE: 97.2 F

## 2020-09-09 DIAGNOSIS — M54.9 DORSALGIA, UNSPECIFIED: ICD-10-CM

## 2020-09-09 DIAGNOSIS — G89.29 DORSALGIA, UNSPECIFIED: ICD-10-CM

## 2020-09-09 DIAGNOSIS — M41.9 SCOLIOSIS, UNSPECIFIED: ICD-10-CM

## 2020-09-09 PROCEDURE — 72110 X-RAY EXAM L-2 SPINE 4/>VWS: CPT

## 2020-09-09 PROCEDURE — 72170 X-RAY EXAM OF PELVIS: CPT | Mod: 59

## 2020-09-09 PROCEDURE — 99214 OFFICE O/P EST MOD 30 MIN: CPT

## 2020-10-22 ENCOUNTER — RX RENEWAL (OUTPATIENT)
Age: 85
End: 2020-10-22

## 2020-11-20 NOTE — ED PROVIDER NOTE - PHYSICAL EXAMINATION
DISPLAY PLAN FREE TEXT PHYSICAL EXAM:   General: well-appearing, appears stated age, in no acute distress  HEENT: NC/AT, anisocoria, conjunctiva WNL. Left posterior occiptal scalp hematoma. No chauhan signs or raccoon eyes.   Cardiovascular: regular rate and rhythm, + S1/S2,   Respiratory: clear to auscultation bilaterally, good aeration bilaterally, nonlabored respirations  Abdominal: soft, nontender, nondistended,  Back: no costovertebral tenderness  Extremities: no LE edema b/l. R shoulder with anterior bony deformity. ROM shoulder limited 2/2 pain. Left fourth finger with ecchymosis, full ROM - ttp DIP and PIP joints. No L gluteal ecchymosis, left sacroiliac point tenderness. Full ROM of L hip- distally neurovascularly intact  Neuro: Alert and oriented x3. Nonfocal neurologic exam  Psychiatric: appropriate mood and affect.   Skin: appropriate color, warm, dry.   -Sommer Thomas PGY-1

## 2021-02-18 NOTE — PHYSICAL THERAPY INITIAL EVALUATION ADULT - ADDITIONAL COMMENTS
ED Time Seen By Provider Entered On:  2/10/2017 20:36     Performed On:  2/10/2017 20:36  by Lennie Doe      Time Seen By Provider   Time Seen by Provider :   2/10/2017 20:01    Lennie Doe - 2/10/2017 20:36            History provided by daughter at bedside; pt resides in private home alone with 3day/week 24h HHA. Pt ambulates with RW/rollator, has one on each level of her home. Has 2 stair lifts to assist. Pt owns shower chair.

## 2021-08-13 ENCOUNTER — APPOINTMENT (OUTPATIENT)
Dept: OPHTHALMOLOGY | Facility: CLINIC | Age: 86
End: 2021-08-13
Payer: MEDICARE

## 2021-08-13 ENCOUNTER — NON-APPOINTMENT (OUTPATIENT)
Age: 86
End: 2021-08-13

## 2021-08-13 PROCEDURE — 92002 INTRM OPH EXAM NEW PATIENT: CPT

## 2021-08-17 ENCOUNTER — APPOINTMENT (OUTPATIENT)
Dept: OPHTHALMOLOGY | Facility: CLINIC | Age: 86
End: 2021-08-17
Payer: MEDICARE

## 2021-08-17 ENCOUNTER — NON-APPOINTMENT (OUTPATIENT)
Age: 86
End: 2021-08-17

## 2021-08-17 PROCEDURE — 92012 INTRM OPH EXAM EST PATIENT: CPT

## 2021-08-19 ENCOUNTER — APPOINTMENT (OUTPATIENT)
Dept: OPHTHALMOLOGY | Facility: CLINIC | Age: 86
End: 2021-08-19
Payer: MEDICARE

## 2021-08-19 ENCOUNTER — NON-APPOINTMENT (OUTPATIENT)
Age: 86
End: 2021-08-19

## 2021-08-19 PROCEDURE — 67028 INJECTION EYE DRUG: CPT | Mod: RT

## 2021-08-19 PROCEDURE — 92014 COMPRE OPH EXAM EST PT 1/>: CPT | Mod: 25

## 2021-08-19 PROCEDURE — 76512 OPH US DX B-SCAN: CPT | Mod: LT

## 2021-08-20 ENCOUNTER — NON-APPOINTMENT (OUTPATIENT)
Age: 86
End: 2021-08-20

## 2021-08-20 ENCOUNTER — APPOINTMENT (OUTPATIENT)
Dept: OPHTHALMOLOGY | Facility: CLINIC | Age: 86
End: 2021-08-20
Payer: MEDICARE

## 2021-08-20 PROCEDURE — 92012 INTRM OPH EXAM EST PATIENT: CPT

## 2021-08-24 ENCOUNTER — NON-APPOINTMENT (OUTPATIENT)
Age: 86
End: 2021-08-24

## 2021-08-24 ENCOUNTER — APPOINTMENT (OUTPATIENT)
Dept: OPHTHALMOLOGY | Facility: AMBULATORY SURGERY CENTER | Age: 86
End: 2021-08-24
Payer: MEDICARE

## 2021-08-24 PROCEDURE — 76512 OPH US DX B-SCAN: CPT | Mod: RT

## 2021-08-24 PROCEDURE — 92012 INTRM OPH EXAM EST PATIENT: CPT | Mod: 25

## 2021-08-24 PROCEDURE — 67028 INJECTION EYE DRUG: CPT | Mod: RT

## 2021-08-26 ENCOUNTER — NON-APPOINTMENT (OUTPATIENT)
Age: 86
End: 2021-08-26

## 2021-08-26 ENCOUNTER — APPOINTMENT (OUTPATIENT)
Dept: OPHTHALMOLOGY | Facility: CLINIC | Age: 86
End: 2021-08-26
Payer: MEDICARE

## 2021-08-26 PROCEDURE — 92012 INTRM OPH EXAM EST PATIENT: CPT | Mod: 25

## 2021-08-26 PROCEDURE — 66030 INJECTION TREATMENT OF EYE: CPT | Mod: RT

## 2021-08-27 ENCOUNTER — APPOINTMENT (OUTPATIENT)
Dept: OPHTHALMOLOGY | Facility: CLINIC | Age: 86
End: 2021-08-27
Payer: MEDICARE

## 2021-08-27 ENCOUNTER — NON-APPOINTMENT (OUTPATIENT)
Age: 86
End: 2021-08-27

## 2021-08-27 PROCEDURE — 99024 POSTOP FOLLOW-UP VISIT: CPT

## 2021-08-27 PROCEDURE — 76512 OPH US DX B-SCAN: CPT | Mod: RT

## 2021-08-30 ENCOUNTER — APPOINTMENT (OUTPATIENT)
Dept: OPHTHALMOLOGY | Facility: CLINIC | Age: 86
End: 2021-08-30
Payer: MEDICARE

## 2021-08-30 ENCOUNTER — NON-APPOINTMENT (OUTPATIENT)
Age: 86
End: 2021-08-30

## 2021-08-30 PROCEDURE — 76512 OPH US DX B-SCAN: CPT | Mod: RT

## 2021-08-30 PROCEDURE — 92012 INTRM OPH EXAM EST PATIENT: CPT | Mod: 24

## 2021-09-09 ENCOUNTER — APPOINTMENT (OUTPATIENT)
Dept: OPHTHALMOLOGY | Facility: CLINIC | Age: 86
End: 2021-09-09

## 2021-09-20 ENCOUNTER — APPOINTMENT (OUTPATIENT)
Dept: GASTROENTEROLOGY | Facility: CLINIC | Age: 86
End: 2021-09-20
Payer: MEDICARE

## 2021-09-20 VITALS
HEIGHT: 56 IN | WEIGHT: 126 LBS | HEART RATE: 78 BPM | OXYGEN SATURATION: 98 % | DIASTOLIC BLOOD PRESSURE: 80 MMHG | BODY MASS INDEX: 28.34 KG/M2 | TEMPERATURE: 96.8 F | SYSTOLIC BLOOD PRESSURE: 128 MMHG

## 2021-09-20 DIAGNOSIS — K51.90 ULCERATIVE COLITIS, UNSPECIFIED, W/OUT COMPLICATIONS: ICD-10-CM

## 2021-09-20 PROCEDURE — 99213 OFFICE O/P EST LOW 20 MIN: CPT

## 2021-09-20 NOTE — PHYSICAL EXAM
Med set up   [General Appearance - Alert] : alert [General Appearance - In No Acute Distress] : in no acute distress [General Appearance - Well Nourished] : well nourished [General Appearance - Well Developed] : well developed [General Appearance - Well-Appearing] : healthy appearing [Neck Appearance] : the appearance of the neck was normal [Neck Cervical Mass (___cm)] : no neck mass was observed [Respiration, Rhythm And Depth] : normal respiratory rhythm and effort [Exaggerated Use Of Accessory Muscles For Inspiration] : no accessory muscle use [Auscultation Breath Sounds / Voice Sounds] : lungs were clear to auscultation bilaterally [Heart Rate And Rhythm] : heart rate was normal and rhythm regular [Heart Sounds] : normal S1 and S2 [Murmurs] : no murmurs [Heart Sounds Gallop] : no gallops [Heart Sounds Pericardial Friction Rub] : no pericardial rub [Edema] : there was no peripheral edema [Abdomen Soft] : soft [Bowel Sounds] : normal bowel sounds [Abdomen Tenderness] : non-tender [] : no hepato-splenomegaly [Abdomen Mass (___ Cm)] : no abdominal mass palpated [Cervical Lymph Nodes Enlarged Posterior Bilaterally] : posterior cervical [Cervical Lymph Nodes Enlarged Anterior Bilaterally] : anterior cervical [Supraclavicular Lymph Nodes Enlarged Bilaterally] : supraclavicular [Nail Clubbing] : no clubbing  or cyanosis of the fingernails [Skin Color & Pigmentation] : normal skin color and pigmentation [Oriented To Time, Place, And Person] : oriented to person, place, and time [Impaired Insight] : insight and judgment were intact [Mood] : the mood was normal [Affect] : the affect was normal [FreeTextEntry1] : Ambulates with a walker.

## 2021-09-20 NOTE — HISTORY OF PRESENT ILLNESS
[FreeTextEntry1] : Office revisit on 9/20/2021.\par \par Patient presents for GI follow-up regarding history of ulcerative colitis and dyspepsia. Daughter in in attendance.\par \par INITIAL HISTORY:\par \par Onset of diarrhea 11/2014. Of note, since the onset of the diarrhea her symptoms have significantly improved.\par \par Salient history:\par     -Onset of diarrhea 11/2014. Previous normal bowel habit described as 2-3 bowel movements daily with a history of chronic mild constipation that prompted the patient to take stool softeners.\par     -Experienced the diarrhea soon after a lunch at the [employer]. No sick contacts from that event. Also receives food from [employer]. A sister and a family friend also had diarrhea at about the same time from that food exposure but their symptoms proved self-limited.\par     -Initially experienced severe diarrhea with urgency in association with numerous frequent nonbloody diarrheal stools. Describes very frequent stools but not able to describe how many. Observed mucus. No blood or oil.\par     -Evaluated by her internist. Empiric therapy with Cipro and Flagyl was prescribed for 10 days. Denies any antibiotics, travel or other relevant exposures before onset of symptoms. Was hospitalized for several days.\par     -Evaluated by gastroenterology: stool studies for qualitative fecal fat was negative. Stool for parasites, culture and Clostridium difficile were negative. Stool for occult blood was negative. CMP including liver enzymes was normal. Normocytic anemia with hemoglobin of 10.5, hematocrit 31.7 and MCV of 94.6 was noted.  CT scan 12/12/14-moderate mural thickening of sigmoid colon and portion of rectum. Diverticulosis noted. Possibly from colitis versus diverticular disease.\par     -Denies fever, dysphagia, nausea, vomiting. Some abdominal discomfort. Occasional heartburn.\par \par Significantly improved over the past 2 weeks. Currently experiencing 3-4 day formed yellow stools without any blood, mucus or oil. Experiencing some urge to move her bowels with a pressure sensation in the rectum and pelvis. Experiencing mild lower abdominal discomfort. Avoiding lactose products which she thinks might be helping. For some reason she is taking flaxseed which she feels helps.\par \par Denies past history of digestive illness. Reports having had a normal colonoscopy 7 years ago. Has an umbilical hernia.\par \par Following initial consultation-improved on a lactose-free diet.\par \par Reported 7/2015 having hard stools and screening. Saw scant blood. Evaluated by an internist and hemorrhoid cream with Proctosol was prescribed. Scant hematochezia with suspected to have been from hemorrhoids.\par \par INTERVAL HISTORY:\par \par -Flexible sigmoidoscopy performed on 8/25/15. Examination to 30 cm. Moderately severe proctosigmoiditis detected. Biopsy noted for chronic active colitis. No dysplasia or viral cytopathic effect detected.\par \par -Following sigmoidoscopy therapy initiated with balsalazide 3 tabs t.i.d. and Canasa suppository 1 g daily for the ulcerative colitis. Pantoprazole 40 mg daily started for complaint of epigastric pain and dyspepsia. Of note, at time of previous assessment patient did not wish to proceed with full colonoscopy or to have an upper endoscopy.\par \par -Review of the 9/17/2015 labs:\par     -CBC: WBC 8400, hemoglobin 11, hematocrit 34.2, MCV 95.8.\par     -Hepatic function profile: Normal. ALT 16.\par     -ESR 38.\par     -Creatinine 0.6.\par      -ferritin 34.6.\par     -Iron 37.\par     -B12 1000.\par     -Folate 24.3. \par \par CURRENT HISTORY:\par \par ORV 3/7/17:    -Doing well from a GI standpoint. Excellent appetite. Patient reports gradual weight increase. Denies any complaints of dysphagia, heartburn, nausea, vomiting or abdominal pain. Patient has 3-4 mostly formed bowel movements daily. Occasional loose stool but does not report diarrhea. Never observed blood.\par                         -Remains on balsalazide 4 tabs p.o. b.i.d.\par                         -Previously discontinued pantoprazole for prior dyspepsia symptoms. However, several months ago patient resumed pantoprazole 40 mg. Doing well. Reports no complaints of heartburn, nausea, vomiting, abdominal pain or early satiety.\par \par ORV 11.7.17:    -Recurrent symptoms of lower abdominal cramps and occasional diarrhea of 3 weeks' duration. Reports increased frequency of bowel movements and now has approximately 4 bowel movements daily which can be small formed stools or loose stools. Some days can have up to 6 bowel movements. The patient can experience mild lower abdominal cramps in association with moving her bowels. Has also experienced a recurrence of the intermittent epigastric discomfort which she previously had.\par                  -Denies fever. Excellent appetite. Weight stable. Reports no complaints of dysphagia, heartburn, nausea or vomiting.\par                  -Reports no travel, diet change or any recent increasing antibiotics. Was started on a new thyroid hormone replacement therapy 6 months ago. Other medications unchanged.\par                  -Remains on pantoprazole 40 mg and balsalazide 4 tabs b.i.d.\par \par ORV 2/9/18:    -Patient presents with complaint of increased diarrhea of approximately one-week duration. Daughter in attendance.\par                          -Following previous evaluation on 11/7/17 patient had significant improvement on regimen of balsalazide 3 tabs t.i.d. and Canasa suppository 1 g q.d. Patient discontinued Canasa suppository sometime in 1/2018. She was having approximately 4 formed bowel movements daily.\par                          -Recurrence of diarrhea reported of one-week duration but mostly of increased severity over the past 2 days. Of note, patient was treated with a 10 day course of amoxicillin for cough which she completed 4 days ago. Currently experiencing diarrhea with passage of frequent loose unformed watery diarrheal movements. Observed mucus but no blood. Denies fever, nausea or vomiting. Experiencing some lower abdominal cramps.\par \par ORV 11/27/18:     -Patient presents with symptoms of increased frequency of bowel movements of one week duration. Prior to one week ago the patient had been doing quite well. The patient was having 3-4 formed bowel movements daily without any complaints of diarrhea, urgency, rectal bleeding or abdominal pain. Overall was asymptomatic from a GI standpoint.\par              -Previously evaluated 2/2018 with antibiotic associated diarrhea attributed to Clostridium difficile. Responded very well to vancomycin.\par               -Past week reports increased frequency of bowel movements. Has up to 8-10 small formed stools with urgency. Stools are formed and not diarrhea. Observed mucus but no blood. Experiences urge to move her bowels postprandial. Denies fever, nausea or vomiting. No abdominal pain except for epigastric sensitivity which has been a long-standing symptom. Reports being under increased stress over the past several weeks. Sustained a fall 2 weeks ago prompting emergency room evaluation but patient incurred no injuries. Denies recent antibiotics or any medication change.\par                  -Patient remains on balsalazide 3 tabs p.o. t.i.d.\par \par ORV 7/24/19:     -Presents for GI followup regarding increased frequency of bowel movements. Reports current symptoms of approximately 2 to 3 months duration. Main complaint is urged to move bowels immediately after eating. Not describing diarrhea. Mostly soft formed bowel movements. Typically 3 or 4 soft formed bowel movements daily. However, immediate need to have bowel movement for his creating anxiety as patient feels it precludes going out, socializing etc. Denies rectal bleeding. Some urgency. Denies fever, nausea, vomiting or abdominal pain. No nocturnal occurrence.\par              -History of ulcerative colitis. Remains on maintenance balsalazide 3 tabs p.o. t.i.d.\par               -Previous history of dyspepsia. Previously treated with pantoprazole 40 mg. However, not currently experiencing dyspepsia symptoms and patient stopped pantoprazole.\par               -Clinical course noted for episode of Clostridium difficile disease 1/2019 after receiving amoxicillin for a dental infection. Patient previously had Clostridium difficile disease in the past. Clostridium difficile PCR was positive. Treated with vancomycin 125 mg q.i.d. ultimately for one month. Patient ultimately recovered and at discussion with daughter on 2/26/19 diarrhea was resolved and the patient was even somewhat constipated.\par \par ORV 8/26/2020:     -Patient presents for followup regarding history of ulcerative colitis. Daughter in attendance. Currently doing well on current regimen of balsalazide 3 tabs p.o. t.i.d. Mostly having 3 bowel movements daily of variable consistency. However, no significant diarrhea reported and denies any hematochezia. Appetite stable and reports several pound weight increase. Denies complaints of dysphagia, nausea, vomiting or abdominal pain. Previously had been on pantoprazole for dyspepsia symptoms but has since been discontinued without recurrent dyspepsia complaints. Had been on Align but discontinued with no change in clinical status. 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 Favorable response to treatment and no recurrence.\par \par ORV 9/20/2021:     -Overall stable from GI standpoint. 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 Apparently infectious etiology versus inflammatory etiology. Treated with 10-day course of Levaquin for 2 weeks which was discontinued on 9/10/2021. Apparently metronidazole prescribed as prophylactic cotherapy in view of prior history of C. difficile disease. In addition, currently on prednisone starting end 8/21. Initially on 60 mg daily and for past few weeks tapered down to 40 mg daily with plan for gradual taper.\par                                 -Some increased dyspepsia symptoms reported. Utilizing Tums intermittently. Parenting mild intermittent epigastric tenderness. Previously had similar symptoms prompting use of pantoprazole in the past with favorable treatment response.\par                                -Patient currently has formed mostly formed bowel movements daily. Usually has a soft formed bowel movement postprandial. Not experiencing diarrhea and reports no rectal bleeding. Remains on regimen of balsalazide 3 tabs p.o. 3 times daily. Reports no complaints of dysphagia, nausea, vomiting or abdominal pain.

## 2021-09-20 NOTE — REASON FOR VISIT
[Follow-Up: _____] : a [unfilled] follow-up visit [Family Member] : family member [FreeTextEntry1] : of ulcerative colitis.

## 2021-09-20 NOTE — CONSULT LETTER
[Dear  ___] : Dear  [unfilled], [Consult Letter:] : I had the pleasure of evaluating your patient, [unfilled]. [Please see my note below.] : Please see my note below. [Consult Closing:] : Thank you very much for allowing me to participate in the care of this patient.  If you have any questions, please do not hesitate to contact me. [Sincerely,] : Sincerely, [FreeTextEntry2] : Dr. Sean Schmitz [FreeTextEntry3] : Jaspreet Lujan M.D.

## 2021-09-20 NOTE — ASSESSMENT
[FreeTextEntry1] : Impression:\par \par #1 ulcerative colitis-proctosigmoiditis to at least 30 cm detected at the time of the 8/25/15 sigmoidoscopy. Proximal extent of disease unclear. Continues to be stable and clinically in remission on balsalazide 3 tabs t.i.d.  \par \par #2 Clostridium difficile diarrhea-  episodes 2/2018 1/2019 and 7/2019-resolved after treatment with vancomycin.\par \par #3 dyspepsia-recurrence in association with prednisone as well as stress regarding visual loss.\par \par General medical problems:\par \par - hypothyroid.\par \par - hypercholesterolemia.\par \par - arthritis-chronic back pain.\par \par - history of anemia-normocytic anemia. Likely multifactorial. Component of iron deficiency suspected related to previous active ulcerative colitis.\par \par - status post fall 12/2015.\par \par - umbilical hernia.\par \par -Visual loss (choroidal detachment and endophthalmitis).

## 2021-09-27 ENCOUNTER — APPOINTMENT (OUTPATIENT)
Dept: OPHTHALMOLOGY | Facility: CLINIC | Age: 86
End: 2021-09-27
Payer: MEDICARE

## 2021-09-27 ENCOUNTER — NON-APPOINTMENT (OUTPATIENT)
Age: 86
End: 2021-09-27

## 2021-09-27 PROCEDURE — 76512 OPH US DX B-SCAN: CPT | Mod: RT

## 2021-09-27 PROCEDURE — 92012 INTRM OPH EXAM EST PATIENT: CPT

## 2021-11-29 ENCOUNTER — NON-APPOINTMENT (OUTPATIENT)
Age: 86
End: 2021-11-29

## 2021-11-29 ENCOUNTER — APPOINTMENT (OUTPATIENT)
Dept: OPHTHALMOLOGY | Facility: CLINIC | Age: 86
End: 2021-11-29
Payer: MEDICARE

## 2021-11-29 PROCEDURE — 92012 INTRM OPH EXAM EST PATIENT: CPT

## 2022-01-27 ENCOUNTER — APPOINTMENT (OUTPATIENT)
Dept: OPHTHALMOLOGY | Facility: CLINIC | Age: 87
End: 2022-01-27
Payer: MEDICARE

## 2022-01-27 ENCOUNTER — NON-APPOINTMENT (OUTPATIENT)
Age: 87
End: 2022-01-27

## 2022-01-27 PROCEDURE — 92014 COMPRE OPH EXAM EST PT 1/>: CPT

## 2022-02-15 ENCOUNTER — RX RENEWAL (OUTPATIENT)
Age: 87
End: 2022-02-15

## 2022-02-17 ENCOUNTER — NON-APPOINTMENT (OUTPATIENT)
Age: 87
End: 2022-02-17

## 2022-02-22 ENCOUNTER — NON-APPOINTMENT (OUTPATIENT)
Age: 87
End: 2022-02-22

## 2022-02-22 ENCOUNTER — RX RENEWAL (OUTPATIENT)
Age: 87
End: 2022-02-22

## 2022-08-01 NOTE — BEHAVIORAL HEALTH ASSESSMENT NOTE - REMOTE MEMORY
Impression: Dry eye syndrome of bilateral lacrimal glands: H04.123. Plan: Dry eyes account for the patient's complaints. There is no evidence of permanent changes to the cornea. Explained condition does not have a cure and will need artificial tears for maintenance. Impaired

## 2022-09-21 NOTE — BEHAVIORAL HEALTH ASSESSMENT NOTE - INSIGHT (REGARDING PSYCHIATRIC ILLNESS)
From: Jumana Sommer  To: Shama Og  Sent: 9/21/2022 11:40 AM CDT  Subject: Next Post Op appt    I was seen in your office last Thursday Sept 15 and your after visit summary states to come back around Oct 27th. The office set up all my appointments before my surgery on Aug 30th. So, my next appt is Oct 13 for Post Op. Would you like to see me around Oct 27th? If so, I will need my appt changed from Oct 13. Please let me know. I can be available for anytime of day.   Poor

## 2022-10-04 RX ORDER — BALSALAZIDE DISODIUM 750 MG/1
750 CAPSULE ORAL
Qty: 810 | Refills: 1 | Status: ACTIVE | COMMUNITY
Start: 2020-03-13 | End: 1900-01-01

## 2022-12-13 ENCOUNTER — APPOINTMENT (OUTPATIENT)
Dept: OPHTHALMOLOGY | Facility: CLINIC | Age: 87
End: 2022-12-13

## 2022-12-20 ENCOUNTER — NON-APPOINTMENT (OUTPATIENT)
Age: 87
End: 2022-12-20

## 2023-01-04 ENCOUNTER — APPOINTMENT (OUTPATIENT)
Dept: GASTROENTEROLOGY | Facility: CLINIC | Age: 88
End: 2023-01-04

## 2023-01-17 RX ORDER — BALSALAZIDE DISODIUM 750 MG/1
750 CAPSULE ORAL
Qty: 810 | Refills: 1 | Status: ACTIVE | COMMUNITY
Start: 2023-01-17 | End: 1900-01-01

## 2023-05-23 RX ORDER — BALSALAZIDE DISODIUM 750 MG/1
750 CAPSULE ORAL
Qty: 810 | Refills: 1 | Status: ACTIVE | COMMUNITY
Start: 2023-05-23 | End: 1900-01-01

## 2023-05-31 ENCOUNTER — APPOINTMENT (OUTPATIENT)
Dept: GASTROENTEROLOGY | Facility: CLINIC | Age: 88
End: 2023-05-31